# Patient Record
Sex: FEMALE | Race: WHITE | NOT HISPANIC OR LATINO | Employment: OTHER | URBAN - METROPOLITAN AREA
[De-identification: names, ages, dates, MRNs, and addresses within clinical notes are randomized per-mention and may not be internally consistent; named-entity substitution may affect disease eponyms.]

---

## 2017-05-02 ENCOUNTER — HOSPITAL ENCOUNTER (INPATIENT)
Facility: HOSPITAL | Age: 82
LOS: 3 days | Discharge: HOME | DRG: 243 | End: 2017-05-05
Attending: EMERGENCY MEDICINE | Admitting: INTERNAL MEDICINE
Payer: MEDICARE

## 2017-05-02 DIAGNOSIS — T50.905A BRADYCARDIA, DRUG INDUCED: ICD-10-CM

## 2017-05-02 DIAGNOSIS — IMO0001: Primary | ICD-10-CM

## 2017-05-02 DIAGNOSIS — R00.1 BRADYCARDIA, DRUG INDUCED: ICD-10-CM

## 2017-05-02 PROBLEM — R53.1 WEAKNESS: Status: ACTIVE | Noted: 2017-05-02

## 2017-05-02 PROBLEM — N17.9 AKI (ACUTE KIDNEY INJURY) (HCC): Status: ACTIVE | Noted: 2017-05-02

## 2017-05-02 PROBLEM — E11.9 TYPE 2 DIABETES MELLITUS (HCC): Status: ACTIVE | Noted: 2017-05-02

## 2017-05-02 PROBLEM — R77.8 ELEVATED TROPONIN: Status: ACTIVE | Noted: 2017-05-02

## 2017-05-02 PROBLEM — E87.5 HYPERKALEMIA: Status: ACTIVE | Noted: 2017-05-02

## 2017-05-02 LAB
ANION GAP BLD CALC-SCNC: 16 MMOL/L (ref 4–13)
ANION GAP SERPL CALCULATED.3IONS-SCNC: 10 MMOL/L (ref 4–13)
APTT PPP: 32 SECONDS (ref 23–35)
ATRIAL RATE: 55 BPM
BASOPHILS # BLD AUTO: 0.03 THOUSANDS/ΜL (ref 0–0.1)
BASOPHILS NFR BLD AUTO: 0 % (ref 0–1)
BUN BLD-MCNC: 82 MG/DL (ref 5–25)
BUN SERPL-MCNC: 71 MG/DL (ref 5–25)
CA-I BLD-SCNC: 1.13 MMOL/L (ref 1.12–1.32)
CALCIUM SERPL-MCNC: 9.2 MG/DL (ref 8.3–10.1)
CHLORIDE BLD-SCNC: 101 MMOL/L (ref 100–108)
CHLORIDE SERPL-SCNC: 102 MMOL/L (ref 100–108)
CO2 SERPL-SCNC: 24 MMOL/L (ref 21–32)
CREAT BLD-MCNC: 2 MG/DL (ref 0.6–1.3)
CREAT SERPL-MCNC: 2.28 MG/DL (ref 0.6–1.3)
EOSINOPHIL # BLD AUTO: 0.09 THOUSAND/ΜL (ref 0–0.61)
EOSINOPHIL NFR BLD AUTO: 1 % (ref 0–6)
ERYTHROCYTE [DISTWIDTH] IN BLOOD BY AUTOMATED COUNT: 14.7 % (ref 11.6–15.1)
GFR SERPL CREATININE-BSD FRML MDRD: 20.3 ML/MIN/1.73SQ M
GFR SERPL CREATININE-BSD FRML MDRD: 23.6 ML/MIN/1.73SQ M
GLUCOSE SERPL-MCNC: 178 MG/DL (ref 65–140)
GLUCOSE SERPL-MCNC: 180 MG/DL (ref 65–140)
GLUCOSE SERPL-MCNC: 239 MG/DL (ref 65–140)
GLUCOSE SERPL-MCNC: 69 MG/DL (ref 65–140)
GLUCOSE SERPL-MCNC: 84 MG/DL (ref 65–140)
GLUCOSE SERPL-MCNC: 99 MG/DL (ref 65–140)
HCT VFR BLD AUTO: 33.2 % (ref 34.8–46.1)
HCT VFR BLD CALC: 32 % (ref 34.8–46.1)
HGB BLD-MCNC: 10.9 G/DL (ref 11.5–15.4)
HGB BLDA-MCNC: 10.9 G/DL (ref 11.5–15.4)
HOLD SPECIMEN: NORMAL
INR PPP: 1.37 (ref 0.86–1.16)
LYMPHOCYTES # BLD AUTO: 1.87 THOUSANDS/ΜL (ref 0.6–4.47)
LYMPHOCYTES NFR BLD AUTO: 22 % (ref 14–44)
MCH RBC QN AUTO: 31.3 PG (ref 26.8–34.3)
MCHC RBC AUTO-ENTMCNC: 32.8 G/DL (ref 31.4–37.4)
MCV RBC AUTO: 95 FL (ref 82–98)
MONOCYTES # BLD AUTO: 0.73 THOUSAND/ΜL (ref 0.17–1.22)
MONOCYTES NFR BLD AUTO: 9 % (ref 4–12)
NEUTROPHILS # BLD AUTO: 5.6 THOUSANDS/ΜL (ref 1.85–7.62)
NEUTS SEG NFR BLD AUTO: 68 % (ref 43–75)
NRBC BLD AUTO-RTO: 0 /100 WBCS
PCO2 BLD: 27 MMOL/L (ref 21–32)
PLATELET # BLD AUTO: 217 THOUSANDS/UL (ref 149–390)
PMV BLD AUTO: 11.3 FL (ref 8.9–12.7)
POTASSIUM BLD-SCNC: 5.7 MMOL/L (ref 3.5–5.3)
POTASSIUM SERPL-SCNC: 6.1 MMOL/L (ref 3.5–5.3)
PROTHROMBIN TIME: 16.9 SECONDS (ref 12.1–14.4)
QRS AXIS: 35 DEGREES
QRSD INTERVAL: 78 MS
QT INTERVAL: 616 MS
QTC INTERVAL: 455 MS
RBC # BLD AUTO: 3.48 MILLION/UL (ref 3.81–5.12)
SODIUM BLD-SCNC: 138 MMOL/L (ref 136–145)
SODIUM SERPL-SCNC: 136 MMOL/L (ref 136–145)
SPECIMEN SOURCE: ABNORMAL
SPECIMEN SOURCE: ABNORMAL
T WAVE AXIS: 52 DEGREES
TROPONIN I BLD-MCNC: 0.22 NG/ML (ref 0–0.08)
VENTRICULAR RATE: 33 BPM
WBC # BLD AUTO: 8.33 THOUSAND/UL (ref 4.31–10.16)

## 2017-05-02 PROCEDURE — 93005 ELECTROCARDIOGRAM TRACING: CPT | Performed by: EMERGENCY MEDICINE

## 2017-05-02 PROCEDURE — 36415 COLL VENOUS BLD VENIPUNCTURE: CPT | Performed by: EMERGENCY MEDICINE

## 2017-05-02 PROCEDURE — 80048 BASIC METABOLIC PNL TOTAL CA: CPT | Performed by: EMERGENCY MEDICINE

## 2017-05-02 PROCEDURE — 85025 COMPLETE CBC W/AUTO DIFF WBC: CPT | Performed by: EMERGENCY MEDICINE

## 2017-05-02 PROCEDURE — 99285 EMERGENCY DEPT VISIT HI MDM: CPT

## 2017-05-02 PROCEDURE — 85730 THROMBOPLASTIN TIME PARTIAL: CPT | Performed by: EMERGENCY MEDICINE

## 2017-05-02 PROCEDURE — 80047 BASIC METABLC PNL IONIZED CA: CPT

## 2017-05-02 PROCEDURE — 85014 HEMATOCRIT: CPT

## 2017-05-02 PROCEDURE — 93005 ELECTROCARDIOGRAM TRACING: CPT

## 2017-05-02 PROCEDURE — 84484 ASSAY OF TROPONIN QUANT: CPT

## 2017-05-02 PROCEDURE — 82948 REAGENT STRIP/BLOOD GLUCOSE: CPT

## 2017-05-02 PROCEDURE — 85610 PROTHROMBIN TIME: CPT | Performed by: EMERGENCY MEDICINE

## 2017-05-02 RX ORDER — SODIUM CHLORIDE 9 MG/ML
75 INJECTION, SOLUTION INTRAVENOUS CONTINUOUS
Status: DISCONTINUED | OUTPATIENT
Start: 2017-05-02 | End: 2017-05-05

## 2017-05-02 RX ORDER — POTASSIUM CHLORIDE 750 MG/1
10 TABLET, EXTENDED RELEASE ORAL DAILY
COMMUNITY
End: 2017-05-05 | Stop reason: HOSPADM

## 2017-05-02 RX ORDER — DEXTROSE MONOHYDRATE 25 G/50ML
INJECTION, SOLUTION INTRAVENOUS
Status: COMPLETED
Start: 2017-05-02 | End: 2017-05-02

## 2017-05-02 RX ORDER — CHOLECALCIFEROL (VITAMIN D3) 50 MCG
2000 TABLET ORAL DAILY
COMMUNITY
End: 2018-07-09 | Stop reason: SDUPTHER

## 2017-05-02 RX ORDER — ASPIRIN 81 MG/1
324 TABLET, CHEWABLE ORAL ONCE
Status: COMPLETED | OUTPATIENT
Start: 2017-05-02 | End: 2017-05-02

## 2017-05-02 RX ORDER — SODIUM CHLORIDE 9 MG/ML
1000 INJECTION, SOLUTION INTRAVENOUS ONCE
Status: COMPLETED | OUTPATIENT
Start: 2017-05-02 | End: 2017-05-02

## 2017-05-02 RX ORDER — SODIUM POLYSTYRENE SULFONATE 15 G/60ML
15 SUSPENSION ORAL; RECTAL ONCE
Status: DISCONTINUED | OUTPATIENT
Start: 2017-05-02 | End: 2017-05-05 | Stop reason: HOSPADM

## 2017-05-02 RX ORDER — ATORVASTATIN CALCIUM 10 MG/1
10 TABLET, FILM COATED ORAL DAILY
Status: DISCONTINUED | OUTPATIENT
Start: 2017-05-03 | End: 2017-05-05 | Stop reason: HOSPADM

## 2017-05-02 RX ORDER — ACETAMINOPHEN 325 MG/1
650 TABLET ORAL EVERY 6 HOURS PRN
Status: DISCONTINUED | OUTPATIENT
Start: 2017-05-02 | End: 2017-05-05 | Stop reason: HOSPADM

## 2017-05-02 RX ORDER — ONDANSETRON 2 MG/ML
4 INJECTION INTRAMUSCULAR; INTRAVENOUS EVERY 6 HOURS PRN
Status: DISCONTINUED | OUTPATIENT
Start: 2017-05-02 | End: 2017-05-05 | Stop reason: HOSPADM

## 2017-05-02 RX ORDER — DOCUSATE SODIUM 100 MG/1
100 CAPSULE, LIQUID FILLED ORAL 2 TIMES DAILY PRN
Status: DISCONTINUED | OUTPATIENT
Start: 2017-05-02 | End: 2017-05-05 | Stop reason: HOSPADM

## 2017-05-02 RX ORDER — CALCIUM CARBONATE 200(500)MG
1000 TABLET,CHEWABLE ORAL DAILY PRN
Status: DISCONTINUED | OUTPATIENT
Start: 2017-05-02 | End: 2017-05-05 | Stop reason: HOSPADM

## 2017-05-02 RX ORDER — ATROPINE SULFATE 0.4 MG/ML
0.4 AMPUL (ML) INJECTION ONCE
Status: DISCONTINUED | OUTPATIENT
Start: 2017-05-02 | End: 2017-05-02 | Stop reason: SDUPTHER

## 2017-05-02 RX ORDER — DOPAMINE HYDROCHLORIDE 160 MG/100ML
1-20 INJECTION, SOLUTION INTRAVENOUS
Status: DISCONTINUED | OUTPATIENT
Start: 2017-05-02 | End: 2017-05-05 | Stop reason: HOSPADM

## 2017-05-02 RX ORDER — SODIUM POLYSTYRENE SULFONATE 15 G/60ML
15 SUSPENSION ORAL; RECTAL ONCE
Status: COMPLETED | OUTPATIENT
Start: 2017-05-02 | End: 2017-05-02

## 2017-05-02 RX ORDER — CLOTRIMAZOLE 1 %
CREAM (GRAM) TOPICAL 2 TIMES DAILY
COMMUNITY
End: 2018-08-02 | Stop reason: ALTCHOICE

## 2017-05-02 RX ORDER — DEXTROSE MONOHYDRATE 25 G/50ML
25 INJECTION, SOLUTION INTRAVENOUS ONCE
Status: COMPLETED | OUTPATIENT
Start: 2017-05-02 | End: 2017-05-02

## 2017-05-02 RX ORDER — LEVOTHYROXINE SODIUM 0.03 MG/1
25 TABLET ORAL
Status: DISCONTINUED | OUTPATIENT
Start: 2017-05-03 | End: 2017-05-05 | Stop reason: HOSPADM

## 2017-05-02 RX ORDER — TORSEMIDE 10 MG/1
10 TABLET ORAL DAILY
COMMUNITY
End: 2017-05-05 | Stop reason: HOSPADM

## 2017-05-02 RX ADMIN — SODIUM CHLORIDE 75 ML/HR: 0.9 INJECTION, SOLUTION INTRAVENOUS at 17:52

## 2017-05-02 RX ADMIN — APIXABAN 2.5 MG: 2.5 TABLET, FILM COATED ORAL at 18:04

## 2017-05-02 RX ADMIN — INSULIN LISPRO 2 UNITS: 100 INJECTION, SOLUTION INTRAVENOUS; SUBCUTANEOUS at 18:22

## 2017-05-02 RX ADMIN — ASPIRIN 324 MG: 81 TABLET, CHEWABLE ORAL at 16:23

## 2017-05-02 RX ADMIN — DEXTROSE MONOHYDRATE 25 ML: 25 INJECTION, SOLUTION INTRAVENOUS at 21:19

## 2017-05-02 RX ADMIN — SODIUM POLYSTYRENE SULFONATE 15 G: 15 SUSPENSION ORAL; RECTAL at 18:23

## 2017-05-02 RX ADMIN — SODIUM CHLORIDE 1000 ML/HR: 0.9 INJECTION, SOLUTION INTRAVENOUS at 16:10

## 2017-05-02 RX ADMIN — ACETAMINOPHEN 650 MG: 325 TABLET, FILM COATED ORAL at 22:06

## 2017-05-02 RX ADMIN — DOPAMINE HYDROCHLORIDE IN DEXTROSE 1 MCG/KG/MIN: 1.6 INJECTION, SOLUTION INTRAVENOUS at 22:27

## 2017-05-02 RX ADMIN — DEXTROSE MONOHYDRATE 25 ML: 25 INJECTION, SOLUTION INTRAVENOUS at 16:24

## 2017-05-02 RX ADMIN — INSULIN HUMAN 10 UNITS: 100 INJECTION, SOLUTION PARENTERAL at 16:26

## 2017-05-02 RX ADMIN — ATROPINE SULFATE 0.4 MG: 0.1 INJECTION, SOLUTION INTRAVENOUS at 18:15

## 2017-05-03 LAB
ANION GAP SERPL CALCULATED.3IONS-SCNC: 14 MMOL/L (ref 4–13)
ATRIAL RATE: 101 BPM
ATRIAL RATE: 288 BPM
ATRIAL RATE: 52 BPM
BUN SERPL-MCNC: 61 MG/DL (ref 5–25)
CALCIUM SERPL-MCNC: 8.8 MG/DL (ref 8.3–10.1)
CHLORIDE SERPL-SCNC: 105 MMOL/L (ref 100–108)
CO2 SERPL-SCNC: 22 MMOL/L (ref 21–32)
CREAT SERPL-MCNC: 1.75 MG/DL (ref 0.6–1.3)
ERYTHROCYTE [DISTWIDTH] IN BLOOD BY AUTOMATED COUNT: 14.7 % (ref 11.6–15.1)
GFR SERPL CREATININE-BSD FRML MDRD: 27.6 ML/MIN/1.73SQ M
GLUCOSE SERPL-MCNC: 138 MG/DL (ref 65–140)
GLUCOSE SERPL-MCNC: 176 MG/DL (ref 65–140)
GLUCOSE SERPL-MCNC: 182 MG/DL (ref 65–140)
GLUCOSE SERPL-MCNC: 209 MG/DL (ref 65–140)
GLUCOSE SERPL-MCNC: 218 MG/DL (ref 65–140)
HCT VFR BLD AUTO: 34.2 % (ref 34.8–46.1)
HGB BLD-MCNC: 11.2 G/DL (ref 11.5–15.4)
MAGNESIUM SERPL-MCNC: 2.3 MG/DL (ref 1.6–2.6)
MCH RBC QN AUTO: 31.3 PG (ref 26.8–34.3)
MCHC RBC AUTO-ENTMCNC: 32.7 G/DL (ref 31.4–37.4)
MCV RBC AUTO: 96 FL (ref 82–98)
P AXIS: 173 DEGREES
PLATELET # BLD AUTO: 220 THOUSANDS/UL (ref 149–390)
PMV BLD AUTO: 11 FL (ref 8.9–12.7)
POTASSIUM SERPL-SCNC: 4.1 MMOL/L (ref 3.5–5.3)
PR INTERVAL: 1178 MS
QRS AXIS: 14 DEGREES
QRS AXIS: 32 DEGREES
QRS AXIS: 44 DEGREES
QRSD INTERVAL: 74 MS
QRSD INTERVAL: 84 MS
QRSD INTERVAL: 92 MS
QT INTERVAL: 492 MS
QT INTERVAL: 498 MS
QT INTERVAL: 730 MS
QTC INTERVAL: 435 MS
QTC INTERVAL: 458 MS
QTC INTERVAL: 540 MS
RBC # BLD AUTO: 3.58 MILLION/UL (ref 3.81–5.12)
SODIUM SERPL-SCNC: 141 MMOL/L (ref 136–145)
T WAVE AXIS: -3 DEGREES
T WAVE AXIS: 44 DEGREES
T WAVE AXIS: 58 DEGREES
T4 FREE SERPL-MCNC: 1.71 NG/DL (ref 0.76–1.46)
TROPONIN I SERPL-MCNC: 0.32 NG/ML
VENTRICULAR RATE: 33 BPM
VENTRICULAR RATE: 46 BPM
VENTRICULAR RATE: 52 BPM
WBC # BLD AUTO: 13.24 THOUSAND/UL (ref 4.31–10.16)

## 2017-05-03 PROCEDURE — G8978 MOBILITY CURRENT STATUS: HCPCS

## 2017-05-03 PROCEDURE — 80048 BASIC METABOLIC PNL TOTAL CA: CPT | Performed by: INTERNAL MEDICINE

## 2017-05-03 PROCEDURE — 82948 REAGENT STRIP/BLOOD GLUCOSE: CPT

## 2017-05-03 PROCEDURE — 85027 COMPLETE CBC AUTOMATED: CPT | Performed by: INTERNAL MEDICINE

## 2017-05-03 PROCEDURE — 83735 ASSAY OF MAGNESIUM: CPT | Performed by: INTERNAL MEDICINE

## 2017-05-03 PROCEDURE — 84484 ASSAY OF TROPONIN QUANT: CPT | Performed by: HOSPITALIST

## 2017-05-03 PROCEDURE — 84439 ASSAY OF FREE THYROXINE: CPT | Performed by: HOSPITALIST

## 2017-05-03 PROCEDURE — G8979 MOBILITY GOAL STATUS: HCPCS

## 2017-05-03 PROCEDURE — 93005 ELECTROCARDIOGRAM TRACING: CPT

## 2017-05-03 PROCEDURE — 97163 PT EVAL HIGH COMPLEX 45 MIN: CPT

## 2017-05-03 PROCEDURE — 93005 ELECTROCARDIOGRAM TRACING: CPT | Performed by: INTERNAL MEDICINE

## 2017-05-03 RX ADMIN — ATORVASTATIN CALCIUM 10 MG: 10 TABLET, FILM COATED ORAL at 09:32

## 2017-05-03 RX ADMIN — DOPAMINE HYDROCHLORIDE IN DEXTROSE 9 MCG/KG/MIN: 1.6 INJECTION, SOLUTION INTRAVENOUS at 17:50

## 2017-05-03 RX ADMIN — SODIUM CHLORIDE 75 ML/HR: 0.9 INJECTION, SOLUTION INTRAVENOUS at 05:43

## 2017-05-03 RX ADMIN — LEVOTHYROXINE SODIUM 25 MCG: 25 TABLET ORAL at 05:45

## 2017-05-03 RX ADMIN — INSULIN LISPRO 1 UNITS: 100 INJECTION, SOLUTION INTRAVENOUS; SUBCUTANEOUS at 21:35

## 2017-05-03 RX ADMIN — INSULIN LISPRO 1 UNITS: 100 INJECTION, SOLUTION INTRAVENOUS; SUBCUTANEOUS at 12:00

## 2017-05-03 RX ADMIN — SODIUM CHLORIDE 75 ML/HR: 0.9 INJECTION, SOLUTION INTRAVENOUS at 17:50

## 2017-05-03 RX ADMIN — INSULIN LISPRO 1 UNITS: 100 INJECTION, SOLUTION INTRAVENOUS; SUBCUTANEOUS at 16:38

## 2017-05-04 ENCOUNTER — APPOINTMENT (INPATIENT)
Dept: RADIOLOGY | Facility: HOSPITAL | Age: 82
DRG: 243 | End: 2017-05-04
Payer: MEDICARE

## 2017-05-04 ENCOUNTER — APPOINTMENT (OUTPATIENT)
Dept: NON INVASIVE DIAGNOSTICS | Facility: HOSPITAL | Age: 82
DRG: 243 | End: 2017-05-04
Attending: INTERNAL MEDICINE
Payer: MEDICARE

## 2017-05-04 ENCOUNTER — ANESTHESIA EVENT (INPATIENT)
Dept: NON INVASIVE DIAGNOSTICS | Facility: HOSPITAL | Age: 82
DRG: 243 | End: 2017-05-04
Payer: MEDICARE

## 2017-05-04 LAB
ANION GAP SERPL CALCULATED.3IONS-SCNC: 11 MMOL/L (ref 4–13)
ATRIAL RATE: 49 BPM
BUN SERPL-MCNC: 34 MG/DL (ref 5–25)
CALCIUM SERPL-MCNC: 8.8 MG/DL (ref 8.3–10.1)
CHLORIDE SERPL-SCNC: 110 MMOL/L (ref 100–108)
CO2 SERPL-SCNC: 22 MMOL/L (ref 21–32)
CREAT SERPL-MCNC: 1.32 MG/DL (ref 0.6–1.3)
ERYTHROCYTE [DISTWIDTH] IN BLOOD BY AUTOMATED COUNT: 14.3 % (ref 11.6–15.1)
GFR SERPL CREATININE-BSD FRML MDRD: 38.2 ML/MIN/1.73SQ M
GLUCOSE SERPL-MCNC: 126 MG/DL (ref 65–140)
GLUCOSE SERPL-MCNC: 130 MG/DL (ref 65–140)
GLUCOSE SERPL-MCNC: 176 MG/DL (ref 65–140)
GLUCOSE SERPL-MCNC: 188 MG/DL (ref 65–140)
GLUCOSE SERPL-MCNC: 217 MG/DL (ref 65–140)
HCT VFR BLD AUTO: 35.1 % (ref 34.8–46.1)
HGB BLD-MCNC: 11.7 G/DL (ref 11.5–15.4)
INR PPP: 1.32 (ref 0.86–1.16)
MCH RBC QN AUTO: 31.7 PG (ref 26.8–34.3)
MCHC RBC AUTO-ENTMCNC: 33.3 G/DL (ref 31.4–37.4)
MCV RBC AUTO: 95 FL (ref 82–98)
PLATELET # BLD AUTO: 254 THOUSANDS/UL (ref 149–390)
PMV BLD AUTO: 11.1 FL (ref 8.9–12.7)
POTASSIUM SERPL-SCNC: 4.1 MMOL/L (ref 3.5–5.3)
PROTHROMBIN TIME: 16.5 SECONDS (ref 12.1–14.4)
QRS AXIS: -5 DEGREES
QRSD INTERVAL: 84 MS
QT INTERVAL: 408 MS
QTC INTERVAL: 364 MS
RBC # BLD AUTO: 3.69 MILLION/UL (ref 3.81–5.12)
SODIUM SERPL-SCNC: 143 MMOL/L (ref 136–145)
T WAVE AXIS: -14 DEGREES
TSH SERPL DL<=0.05 MIU/L-ACNC: 0.83 UIU/ML (ref 0.36–3.74)
VENTRICULAR RATE: 48 BPM
WBC # BLD AUTO: 11.54 THOUSAND/UL (ref 4.31–10.16)

## 2017-05-04 PROCEDURE — 02HK3JZ INSERTION OF PACEMAKER LEAD INTO RIGHT VENTRICLE, PERCUTANEOUS APPROACH: ICD-10-PCS | Performed by: INTERNAL MEDICINE

## 2017-05-04 PROCEDURE — 84443 ASSAY THYROID STIM HORMONE: CPT | Performed by: HOSPITALIST

## 2017-05-04 PROCEDURE — 93005 ELECTROCARDIOGRAM TRACING: CPT | Performed by: INTERNAL MEDICINE

## 2017-05-04 PROCEDURE — 93005 ELECTROCARDIOGRAM TRACING: CPT | Performed by: PHYSICIAN ASSISTANT

## 2017-05-04 PROCEDURE — 71010 HB CHEST X-RAY 1 VIEW FRONTAL (PORTABLE): CPT

## 2017-05-04 PROCEDURE — 82948 REAGENT STRIP/BLOOD GLUCOSE: CPT

## 2017-05-04 PROCEDURE — 0JH604Z INSERTION OF PACEMAKER, SINGLE CHAMBER INTO CHEST SUBCUTANEOUS TISSUE AND FASCIA, OPEN APPROACH: ICD-10-PCS | Performed by: INTERNAL MEDICINE

## 2017-05-04 PROCEDURE — 99152 MOD SED SAME PHYS/QHP 5/>YRS: CPT | Performed by: PHYSICIAN ASSISTANT

## 2017-05-04 PROCEDURE — C1892 INTRO/SHEATH,FIXED,PEEL-AWAY: HCPCS | Performed by: PHYSICIAN ASSISTANT

## 2017-05-04 PROCEDURE — C1786 PMKR, SINGLE, RATE-RESP: HCPCS

## 2017-05-04 PROCEDURE — 80048 BASIC METABOLIC PNL TOTAL CA: CPT | Performed by: PHYSICIAN ASSISTANT

## 2017-05-04 PROCEDURE — 33207 INSERT HEART PM VENTRICULAR: CPT | Performed by: PHYSICIAN ASSISTANT

## 2017-05-04 PROCEDURE — 99153 MOD SED SAME PHYS/QHP EA: CPT | Performed by: PHYSICIAN ASSISTANT

## 2017-05-04 PROCEDURE — C1898 LEAD, PMKR, OTHER THAN TRANS: HCPCS

## 2017-05-04 PROCEDURE — 85610 PROTHROMBIN TIME: CPT | Performed by: PHYSICIAN ASSISTANT

## 2017-05-04 PROCEDURE — 85027 COMPLETE CBC AUTOMATED: CPT | Performed by: PHYSICIAN ASSISTANT

## 2017-05-04 RX ORDER — MIDAZOLAM HYDROCHLORIDE 1 MG/ML
INJECTION INTRAMUSCULAR; INTRAVENOUS CODE/TRAUMA/SEDATION MEDICATION
Status: COMPLETED | OUTPATIENT
Start: 2017-05-04 | End: 2017-05-04

## 2017-05-04 RX ORDER — LIDOCAINE HYDROCHLORIDE 10 MG/ML
INJECTION, SOLUTION INFILTRATION; PERINEURAL CODE/TRAUMA/SEDATION MEDICATION
Status: COMPLETED | OUTPATIENT
Start: 2017-05-04 | End: 2017-05-04

## 2017-05-04 RX ORDER — INSULIN GLARGINE 100 [IU]/ML
5 INJECTION, SOLUTION SUBCUTANEOUS
Status: DISCONTINUED | OUTPATIENT
Start: 2017-05-04 | End: 2017-05-05 | Stop reason: HOSPADM

## 2017-05-04 RX ORDER — FENTANYL CITRATE 50 UG/ML
INJECTION, SOLUTION INTRAMUSCULAR; INTRAVENOUS CODE/TRAUMA/SEDATION MEDICATION
Status: COMPLETED | OUTPATIENT
Start: 2017-05-04 | End: 2017-05-04

## 2017-05-04 RX ORDER — GENTAMICIN SULFATE 40 MG/ML
INJECTION, SOLUTION INTRAMUSCULAR; INTRAVENOUS CODE/TRAUMA/SEDATION MEDICATION
Status: COMPLETED | OUTPATIENT
Start: 2017-05-04 | End: 2017-05-04

## 2017-05-04 RX ADMIN — FENTANYL CITRATE 100 MCG: 50 INJECTION, SOLUTION INTRAMUSCULAR; INTRAVENOUS at 17:38

## 2017-05-04 RX ADMIN — INSULIN LISPRO 1 UNITS: 100 INJECTION, SOLUTION INTRAVENOUS; SUBCUTANEOUS at 12:12

## 2017-05-04 RX ADMIN — CEFAZOLIN SODIUM 1000 MG: 1 SOLUTION INTRAVENOUS at 17:40

## 2017-05-04 RX ADMIN — GENTAMICIN SULFATE 80 MG: 40 INJECTION, SOLUTION INTRAMUSCULAR; INTRAVENOUS at 17:59

## 2017-05-04 RX ADMIN — LIDOCAINE HYDROCHLORIDE 30 ML: 10 INJECTION, SOLUTION INFILTRATION; PERINEURAL at 17:45

## 2017-05-04 RX ADMIN — IOHEXOL 10 ML: 350 INJECTION, SOLUTION INTRAVENOUS at 17:49

## 2017-05-04 RX ADMIN — MIDAZOLAM HYDROCHLORIDE 2 MG: 1 INJECTION, SOLUTION INTRAMUSCULAR; INTRAVENOUS at 17:37

## 2017-05-04 RX ADMIN — ATORVASTATIN CALCIUM 10 MG: 10 TABLET, FILM COATED ORAL at 18:51

## 2017-05-04 RX ADMIN — LEVOTHYROXINE SODIUM 25 MCG: 25 TABLET ORAL at 06:03

## 2017-05-04 RX ADMIN — INSULIN GLARGINE 5 UNITS: 100 INJECTION, SOLUTION SUBCUTANEOUS at 21:28

## 2017-05-04 RX ADMIN — SODIUM CHLORIDE 75 ML/HR: 0.9 INJECTION, SOLUTION INTRAVENOUS at 06:00

## 2017-05-04 RX ADMIN — FENTANYL CITRATE 25 MCG: 50 INJECTION, SOLUTION INTRAMUSCULAR; INTRAVENOUS at 18:02

## 2017-05-04 RX ADMIN — MIDAZOLAM HYDROCHLORIDE 1 MG: 1 INJECTION, SOLUTION INTRAMUSCULAR; INTRAVENOUS at 18:02

## 2017-05-04 RX ADMIN — INSULIN LISPRO 1 UNITS: 100 INJECTION, SOLUTION INTRAVENOUS; SUBCUTANEOUS at 06:44

## 2017-05-04 RX ADMIN — DOPAMINE HYDROCHLORIDE IN DEXTROSE 14.8 MCG/KG/MIN: 1.6 INJECTION, SOLUTION INTRAVENOUS at 06:00

## 2017-05-05 ENCOUNTER — APPOINTMENT (INPATIENT)
Dept: RADIOLOGY | Facility: HOSPITAL | Age: 82
DRG: 243 | End: 2017-05-05
Attending: INTERNAL MEDICINE
Payer: MEDICARE

## 2017-05-05 VITALS
RESPIRATION RATE: 18 BRPM | HEART RATE: 60 BPM | HEIGHT: 65 IN | DIASTOLIC BLOOD PRESSURE: 76 MMHG | BODY MASS INDEX: 24.61 KG/M2 | TEMPERATURE: 97.9 F | SYSTOLIC BLOOD PRESSURE: 124 MMHG | OXYGEN SATURATION: 95 % | WEIGHT: 147.71 LBS

## 2017-05-05 PROBLEM — R77.8 ELEVATED TROPONIN: Status: RESOLVED | Noted: 2017-05-02 | Resolved: 2017-05-05

## 2017-05-05 PROBLEM — N17.9 AKI (ACUTE KIDNEY INJURY) (HCC): Status: RESOLVED | Noted: 2017-05-02 | Resolved: 2017-05-05

## 2017-05-05 PROBLEM — E87.5 HYPERKALEMIA: Status: RESOLVED | Noted: 2017-05-02 | Resolved: 2017-05-05

## 2017-05-05 PROBLEM — Z95.0 STATUS POST PLACEMENT OF CARDIAC PACEMAKER: Chronic | Status: ACTIVE | Noted: 2017-05-05

## 2017-05-05 PROBLEM — R00.1 BRADYCARDIA, DRUG INDUCED: Status: RESOLVED | Noted: 2017-05-02 | Resolved: 2017-05-05

## 2017-05-05 PROBLEM — R53.1 WEAKNESS: Status: RESOLVED | Noted: 2017-05-02 | Resolved: 2017-05-05

## 2017-05-05 PROBLEM — T50.905A BRADYCARDIA, DRUG INDUCED: Status: RESOLVED | Noted: 2017-05-02 | Resolved: 2017-05-05

## 2017-05-05 LAB
ANION GAP SERPL CALCULATED.3IONS-SCNC: 8 MMOL/L (ref 4–13)
ATRIAL RATE: 227 BPM
ATRIAL RATE: 54 BPM
BUN SERPL-MCNC: 23 MG/DL (ref 5–25)
CALCIUM SERPL-MCNC: 8.2 MG/DL (ref 8.3–10.1)
CHLORIDE SERPL-SCNC: 115 MMOL/L (ref 100–108)
CO2 SERPL-SCNC: 23 MMOL/L (ref 21–32)
CREAT SERPL-MCNC: 0.99 MG/DL (ref 0.6–1.3)
ERYTHROCYTE [DISTWIDTH] IN BLOOD BY AUTOMATED COUNT: 14.4 % (ref 11.6–15.1)
FERRITIN SERPL-MCNC: 94 NG/ML (ref 8–388)
FOLATE SERPL-MCNC: >20 NG/ML (ref 3.1–17.5)
GFR SERPL CREATININE-BSD FRML MDRD: 53.2 ML/MIN/1.73SQ M
GLUCOSE SERPL-MCNC: 135 MG/DL (ref 65–140)
GLUCOSE SERPL-MCNC: 75 MG/DL (ref 65–140)
GLUCOSE SERPL-MCNC: 91 MG/DL (ref 65–140)
HCT VFR BLD AUTO: 28.8 % (ref 34.8–46.1)
HGB BLD-MCNC: 9.5 G/DL (ref 11.5–15.4)
IRON SATN MFR SERPL: 16 %
IRON SERPL-MCNC: 32 UG/DL (ref 50–170)
MCH RBC QN AUTO: 31.6 PG (ref 26.8–34.3)
MCHC RBC AUTO-ENTMCNC: 33 G/DL (ref 31.4–37.4)
MCV RBC AUTO: 96 FL (ref 82–98)
PLATELET # BLD AUTO: 179 THOUSANDS/UL (ref 149–390)
PMV BLD AUTO: 10.5 FL (ref 8.9–12.7)
POTASSIUM SERPL-SCNC: 3.6 MMOL/L (ref 3.5–5.3)
QRS AXIS: -67 DEGREES
QRS AXIS: -75 DEGREES
QRSD INTERVAL: 122 MS
QRSD INTERVAL: 124 MS
QT INTERVAL: 482 MS
QT INTERVAL: 486 MS
QTC INTERVAL: 482 MS
QTC INTERVAL: 486 MS
RBC # BLD AUTO: 3.01 MILLION/UL (ref 3.81–5.12)
RETICS # AUTO: ABNORMAL 10*3/UL (ref 14097–95744)
RETICS # CALC: 2.22 % (ref 0.37–1.87)
SODIUM SERPL-SCNC: 146 MMOL/L (ref 136–145)
T WAVE AXIS: 78 DEGREES
T WAVE AXIS: 82 DEGREES
TIBC SERPL-MCNC: 205 UG/DL (ref 250–450)
VENTRICULAR RATE: 60 BPM
VENTRICULAR RATE: 60 BPM
VIT B12 SERPL-MCNC: 273 PG/ML (ref 100–900)
WBC # BLD AUTO: 6.54 THOUSAND/UL (ref 4.31–10.16)

## 2017-05-05 PROCEDURE — 80048 BASIC METABOLIC PNL TOTAL CA: CPT | Performed by: PHYSICIAN ASSISTANT

## 2017-05-05 PROCEDURE — 82948 REAGENT STRIP/BLOOD GLUCOSE: CPT

## 2017-05-05 PROCEDURE — 82607 VITAMIN B-12: CPT | Performed by: HOSPITALIST

## 2017-05-05 PROCEDURE — 93005 ELECTROCARDIOGRAM TRACING: CPT | Performed by: INTERNAL MEDICINE

## 2017-05-05 PROCEDURE — 83540 ASSAY OF IRON: CPT | Performed by: HOSPITALIST

## 2017-05-05 PROCEDURE — 82746 ASSAY OF FOLIC ACID SERUM: CPT | Performed by: HOSPITALIST

## 2017-05-05 PROCEDURE — 85045 AUTOMATED RETICULOCYTE COUNT: CPT | Performed by: HOSPITALIST

## 2017-05-05 PROCEDURE — 82728 ASSAY OF FERRITIN: CPT | Performed by: HOSPITALIST

## 2017-05-05 PROCEDURE — 85027 COMPLETE CBC AUTOMATED: CPT | Performed by: PHYSICIAN ASSISTANT

## 2017-05-05 PROCEDURE — 4B02XTZ MEASUREMENT OF CARDIAC DEFIBRILLATOR, EXTERNAL APPROACH: ICD-10-PCS | Performed by: HOSPITALIST

## 2017-05-05 PROCEDURE — 83550 IRON BINDING TEST: CPT | Performed by: HOSPITALIST

## 2017-05-05 PROCEDURE — 71020 HB CHEST X-RAY 2VW FRONTAL&LATL: CPT

## 2017-05-05 RX ORDER — INSULIN GLARGINE 100 [IU]/ML
5 INJECTION, SOLUTION SUBCUTANEOUS
Qty: 150 UNITS | Refills: 0 | Status: SHIPPED | OUTPATIENT
Start: 2017-05-05 | End: 2017-05-05 | Stop reason: HOSPADM

## 2017-05-05 RX ADMIN — ATORVASTATIN CALCIUM 10 MG: 10 TABLET, FILM COATED ORAL at 09:21

## 2017-05-05 RX ADMIN — LEVOTHYROXINE SODIUM 25 MCG: 25 TABLET ORAL at 06:19

## 2017-05-05 RX ADMIN — SODIUM CHLORIDE 75 ML/HR: 0.9 INJECTION, SOLUTION INTRAVENOUS at 06:11

## 2017-05-10 ENCOUNTER — APPOINTMENT (EMERGENCY)
Dept: RADIOLOGY | Facility: HOSPITAL | Age: 82
End: 2017-05-10
Payer: MEDICARE

## 2017-05-10 ENCOUNTER — HOSPITAL ENCOUNTER (EMERGENCY)
Facility: HOSPITAL | Age: 82
Discharge: HOME | End: 2017-05-10
Attending: EMERGENCY MEDICINE | Admitting: EMERGENCY MEDICINE
Payer: MEDICARE

## 2017-05-10 VITALS
DIASTOLIC BLOOD PRESSURE: 79 MMHG | SYSTOLIC BLOOD PRESSURE: 169 MMHG | OXYGEN SATURATION: 98 % | HEIGHT: 65 IN | HEART RATE: 60 BPM | TEMPERATURE: 98.5 F | RESPIRATION RATE: 14 BRPM | WEIGHT: 147 LBS | BODY MASS INDEX: 24.49 KG/M2

## 2017-05-10 DIAGNOSIS — Z95.0 HISTORY OF PERMANENT CARDIAC PACEMAKER PLACEMENT: ICD-10-CM

## 2017-05-10 DIAGNOSIS — M79.89 LEFT ARM SWELLING: Primary | ICD-10-CM

## 2017-05-10 DIAGNOSIS — I82.612 CEPHALIC VEIN THROMBOSIS, LEFT: ICD-10-CM

## 2017-05-10 LAB
ATRIAL RATE: 122 BPM
P AXIS: 0 DEGREES
QRS AXIS: -78 DEGREES
QRSD INTERVAL: 152 MS
QT INTERVAL: 462 MS
QTC INTERVAL: 457 MS
T WAVE AXIS: 101 DEGREES
VENTRICULAR RATE: 59 BPM

## 2017-05-10 PROCEDURE — 93005 ELECTROCARDIOGRAM TRACING: CPT | Performed by: EMERGENCY MEDICINE

## 2017-05-10 PROCEDURE — 93971 EXTREMITY STUDY: CPT

## 2017-05-10 PROCEDURE — 99284 EMERGENCY DEPT VISIT MOD MDM: CPT

## 2017-05-10 PROCEDURE — 71020 HB CHEST X-RAY 2VW FRONTAL&LATL: CPT

## 2017-05-10 RX ORDER — TORSEMIDE 10 MG/1
10 TABLET ORAL AS NEEDED
COMMUNITY
End: 2018-01-10 | Stop reason: HOSPADM

## 2017-05-22 ENCOUNTER — ALLSCRIPTS OFFICE VISIT (OUTPATIENT)
Dept: OTHER | Facility: OTHER | Age: 82
End: 2017-05-22

## 2017-12-06 ENCOUNTER — ALLSCRIPTS OFFICE VISIT (OUTPATIENT)
Dept: OTHER | Facility: OTHER | Age: 82
End: 2017-12-06

## 2017-12-07 NOTE — CONSULTS
Assessment    1  Incontinence (178 91) (R32)    Discussion/Summary  Discussion Summary:   49-year-old female with urinary urgency and urge incontinence  Had a lengthy discussion with the patient and her daughter  I would like to try her on Myrbetriq  Side effects of the medication were discussed  We discussed we will try at least 2 medications prior to discussing advanced treatment options  She will follow up in 6 weeks to re-evaluate her symptoms on the new medicine  Chief Complaint  Chief Complaint Free Text Note Form: Patient presents for worsening incontinence      History of Present Illness  HPI: 49-year-old female presents for evaluation of urinary incontinence  The patient has had this for numerous years but states that is getting worse  She currently has no control of urination  She states she wears 6 pads during the day and 4 pads at night  She is in a nursing home with limited mobility  She denies any recent UTIs or gross hematuria  She has no other complaints  Review of Systems  Complete-Female Urology:  Constitutional: No fever, no chills, feels well, no tiredness, no recent weight gain or weight loss  Respiratory: No complaints of shortness of breath, no wheezing, no cough, no SOB on exertion, no orthopnea, no PND  Cardiovascular: No complaints of slow heart rate, no fast heart rate, no chest pain, no palpitations, no leg claudication, no lower extremity edema  Gastrointestinal: No complaints of abdominal pain, no constipation, no nausea or vomiting, no diarrhea, no bloody stools  Genitourinary: 6 pad durin the day and 4 pads at night; has constant leak,-- incontinence-- and-- stream quality good, but-- no dysuria,-- no urinary hesitancy,-- no empty sensation-- and-- no hematuria--   The patient presents with complaints of nocturia (1-2x)  Musculoskeletal: No complaints of arthralgias, no myalgias, no joint swelling or stiffness, no limb pain or swelling    Integumentary: No complaints of skin rash or lesions, no itching, no skin wounds, no breast pain or lump  Hematologic/Lymphatic: No complaints of swollen glands, no swollen glands in the neck, does not bleed easily, does not bruise easily  Neurological: No complaints of headache, no confusion, no convulsions, no numbness, no dizziness or fainting, no tingling, no limb weakness, no difficulty walking  ROS Reviewed:   ROS reviewed  Active Problems  1  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)   2  Atherosclerotic peripheral vascular disease (440 20) (I70 209)   3  Callus (700) (L84)   4  Diabetes mellitus with polyneuropathy (250 60,357 2) (E11 42)   5  Incontinence (788 30) (R32)   6  Onychomycosis (110 1) (B35 1)   7  Pain in both feet (729 5) (M79 671,M79 672)    Past Medical History  Active Problems And Past Medical History Reviewed: The active problems and past medical history were reviewed and updated today  Surgical History  Surgical History Reviewed: The surgical history was reviewed and updated today  Family History  Family History Reviewed: The family history was reviewed and updated today  Social History     · Never a smoker  Social History Reviewed: The social history was reviewed and updated today  Current Meds   1  Acetaminophen 325 MG Oral Tablet; Therapy: (Recorded:29Jun2016) to Recorded   2  Atorvastatin Calcium 10 MG Oral Tablet; Therapy: (Recorded:29Jun2016) to Recorded   3  Clotrimazole-Betamethasone 1-0 05 % External Cream; Therapy: (Recorded:29Jun2016) to Recorded   4  Eliquis TABS; Therapy: (Recorded:29Jun2016) to Recorded   5  Ferrex 150 Plus 150-50-50 MG Oral Capsule; Therapy: (21 ) to Recorded   6  Fluticasone Propionate 50 MCG/ACT Nasal Suspension; Therapy: (Recorded:[id number]) to Recorded   7  Glucagon Emergency 1 MG Injection Kit; Therapy: (21 ) to Recorded   8  HumaLOG SOLN; Therapy: (Recorded:29Jun2016) to Recorded   9   Irbesartan 150 MG Oral Tablet; Therapy: (Recorded:29Jun2016) to Recorded   10  Levothyroxine Sodium 25 MCG CAPS; Therapy: (Recorded:29Jun2016) to Recorded   11  Lisinopril 10 MG Oral Tablet; Therapy: (Recorded:06Dec2017) to Recorded   12  Mapap 500 MG Oral Capsule; Therapy: (Recorded:06Dec2017) to Recorded   13  Metoprolol Tartrate TABS; Therapy: (Recorded:29Jun2016) to Recorded   14  Milk of Magnesia SUSP; Therapy: (Recorded:29Jun2016) to Recorded   15  Potassium Chloride 10 MEQ TBCR; Therapy: (Recorded:29Jun2016) to Recorded   16  Torsemide 10 MG Oral Tablet; Therapy: (Recorded:06Dec2017) to Recorded   17  Vitamin D3 2000 UNIT Oral Tablet; Therapy: (Recorded:06Dec2017) to Recorded  Medication List Reviewed: The medication list was reviewed and updated today  Allergies  1  No Known Drug Allergies    Vitals  Vital Signs    Recorded: [id number] 02:05PM   Heart Rate 60   Systolic 097   Diastolic 90   Height Unobtainable Yes   Weight Unobtainable Yes       Physical Exam   Constitutional  General appearance: Abnormal  -- Elderly female in wheelchair  Pulmonary  Respiratory effort: No increased work of breathing or signs of respiratory distress  Cardiovascular  Examination of extremities for edema and/or varicosities: Normal    Abdomen  Abdomen: Non-tender, no masses  Liver and spleen: No hepatomegaly or splenomegaly  Musculoskeletal  Gait and station: Abnormal  -- In wheelchair  Skin  Skin and subcutaneous tissue: Normal without rashes or lesions     Additional Exam:  Neuro exam nonfocal       Signatures   Electronically signed by : FREDERIC Hickman ; Dec  6 2017  2:36PM EST                       (Author)

## 2018-01-05 ENCOUNTER — APPOINTMENT (EMERGENCY)
Dept: RADIOLOGY | Facility: HOSPITAL | Age: 83
DRG: 866 | End: 2018-01-05
Payer: MEDICARE

## 2018-01-05 ENCOUNTER — HOSPITAL ENCOUNTER (INPATIENT)
Facility: HOSPITAL | Age: 83
LOS: 4 days | Discharge: NURSING, REHAB OR LONG-TERM CARE | DRG: 866 | End: 2018-01-10
Attending: EMERGENCY MEDICINE | Admitting: INTERNAL MEDICINE
Payer: MEDICARE

## 2018-01-05 DIAGNOSIS — R42 DIZZINESS: ICD-10-CM

## 2018-01-05 DIAGNOSIS — R50.9 FEVER: ICD-10-CM

## 2018-01-05 DIAGNOSIS — R26.2 AMBULATORY DYSFUNCTION: Primary | ICD-10-CM

## 2018-01-05 PROBLEM — E78.5 HYPERLIPIDEMIA: Status: ACTIVE | Noted: 2018-01-05

## 2018-01-05 PROBLEM — L53.9 ERYTHEMA OF LOWER EXTREMITY: Status: ACTIVE | Noted: 2018-01-05

## 2018-01-05 LAB
ALBUMIN SERPL BCP-MCNC: 3.4 G/DL (ref 3.5–5)
ALP SERPL-CCNC: 88 U/L (ref 46–116)
ALT SERPL W P-5'-P-CCNC: 25 U/L (ref 12–78)
ANION GAP SERPL CALCULATED.3IONS-SCNC: 8 MMOL/L (ref 4–13)
AST SERPL W P-5'-P-CCNC: 21 U/L (ref 5–45)
ATRIAL RATE: 267 BPM
BASOPHILS # BLD AUTO: 0.01 THOUSANDS/ΜL (ref 0–0.1)
BASOPHILS NFR BLD AUTO: 0 % (ref 0–1)
BILIRUB SERPL-MCNC: 0.88 MG/DL (ref 0.2–1)
BILIRUB UR QL STRIP: NEGATIVE
BUN SERPL-MCNC: 20 MG/DL (ref 5–25)
CALCIUM SERPL-MCNC: 9 MG/DL (ref 8.3–10.1)
CHLORIDE SERPL-SCNC: 106 MMOL/L (ref 100–108)
CLARITY UR: CLEAR
CO2 SERPL-SCNC: 27 MMOL/L (ref 21–32)
COLOR UR: YELLOW
COLOR, POC: NORMAL
CREAT SERPL-MCNC: 1.1 MG/DL (ref 0.6–1.3)
EOSINOPHIL # BLD AUTO: 0.32 THOUSAND/ΜL (ref 0–0.61)
EOSINOPHIL NFR BLD AUTO: 4 % (ref 0–6)
ERYTHROCYTE [DISTWIDTH] IN BLOOD BY AUTOMATED COUNT: 13.7 % (ref 11.6–15.1)
FLUAV AG SPEC QL: NORMAL
FLUBV AG SPEC QL: NORMAL
GFR SERPL CREATININE-BSD FRML MDRD: 46 ML/MIN/1.73SQ M
GLUCOSE SERPL-MCNC: 121 MG/DL (ref 65–140)
GLUCOSE SERPL-MCNC: 151 MG/DL (ref 65–140)
GLUCOSE SERPL-MCNC: 160 MG/DL (ref 65–140)
GLUCOSE UR STRIP-MCNC: NEGATIVE MG/DL
HCT VFR BLD AUTO: 33.8 % (ref 34.8–46.1)
HGB BLD-MCNC: 11.3 G/DL (ref 11.5–15.4)
HGB UR QL STRIP.AUTO: NEGATIVE
KETONES UR STRIP-MCNC: NEGATIVE MG/DL
LACTATE SERPL-SCNC: 1.1 MMOL/L (ref 0.5–2)
LEUKOCYTE ESTERASE UR QL STRIP: NEGATIVE
LYMPHOCYTES # BLD AUTO: 1.02 THOUSANDS/ΜL (ref 0.6–4.47)
LYMPHOCYTES NFR BLD AUTO: 13 % (ref 14–44)
MCH RBC QN AUTO: 31.6 PG (ref 26.8–34.3)
MCHC RBC AUTO-ENTMCNC: 33.4 G/DL (ref 31.4–37.4)
MCV RBC AUTO: 94 FL (ref 82–98)
MONOCYTES # BLD AUTO: 0.89 THOUSAND/ΜL (ref 0.17–1.22)
MONOCYTES NFR BLD AUTO: 11 % (ref 4–12)
NEUTROPHILS # BLD AUTO: 5.54 THOUSANDS/ΜL (ref 1.85–7.62)
NEUTS SEG NFR BLD AUTO: 72 % (ref 43–75)
NITRITE UR QL STRIP: NEGATIVE
NRBC BLD AUTO-RTO: 0 /100 WBCS
PH UR STRIP.AUTO: 5.5 [PH] (ref 4.5–8)
PLATELET # BLD AUTO: 187 THOUSANDS/UL (ref 149–390)
PMV BLD AUTO: 10.4 FL (ref 8.9–12.7)
POTASSIUM SERPL-SCNC: 3.7 MMOL/L (ref 3.5–5.3)
PROT SERPL-MCNC: 7.3 G/DL (ref 6.4–8.2)
PROT UR STRIP-MCNC: NEGATIVE MG/DL
QRS AXIS: -70 DEGREES
QRSD INTERVAL: 128 MS
QT INTERVAL: 410 MS
QTC INTERVAL: 433 MS
RBC # BLD AUTO: 3.58 MILLION/UL (ref 3.81–5.12)
RSV B RNA SPEC QL NAA+PROBE: NORMAL
SODIUM SERPL-SCNC: 141 MMOL/L (ref 136–145)
SP GR UR STRIP.AUTO: 1.01 (ref 1–1.03)
T WAVE AXIS: 105 DEGREES
TROPONIN I SERPL-MCNC: 0.02 NG/ML
UROBILINOGEN UR QL STRIP.AUTO: 0.2 E.U./DL
VENTRICULAR RATE: 67 BPM
WBC # BLD AUTO: 7.8 THOUSAND/UL (ref 4.31–10.16)

## 2018-01-05 PROCEDURE — 87040 BLOOD CULTURE FOR BACTERIA: CPT | Performed by: EMERGENCY MEDICINE

## 2018-01-05 PROCEDURE — 99285 EMERGENCY DEPT VISIT HI MDM: CPT

## 2018-01-05 PROCEDURE — 82948 REAGENT STRIP/BLOOD GLUCOSE: CPT

## 2018-01-05 PROCEDURE — 93005 ELECTROCARDIOGRAM TRACING: CPT | Performed by: EMERGENCY MEDICINE

## 2018-01-05 PROCEDURE — 36415 COLL VENOUS BLD VENIPUNCTURE: CPT

## 2018-01-05 PROCEDURE — 71046 X-RAY EXAM CHEST 2 VIEWS: CPT

## 2018-01-05 PROCEDURE — 96361 HYDRATE IV INFUSION ADD-ON: CPT

## 2018-01-05 PROCEDURE — 81003 URINALYSIS AUTO W/O SCOPE: CPT

## 2018-01-05 PROCEDURE — 87081 CULTURE SCREEN ONLY: CPT | Performed by: INTERNAL MEDICINE

## 2018-01-05 PROCEDURE — 87798 DETECT AGENT NOS DNA AMP: CPT | Performed by: EMERGENCY MEDICINE

## 2018-01-05 PROCEDURE — 96360 HYDRATION IV INFUSION INIT: CPT

## 2018-01-05 PROCEDURE — 84484 ASSAY OF TROPONIN QUANT: CPT | Performed by: EMERGENCY MEDICINE

## 2018-01-05 PROCEDURE — 81002 URINALYSIS NONAUTO W/O SCOPE: CPT | Performed by: EMERGENCY MEDICINE

## 2018-01-05 PROCEDURE — 93005 ELECTROCARDIOGRAM TRACING: CPT

## 2018-01-05 PROCEDURE — 70450 CT HEAD/BRAIN W/O DYE: CPT

## 2018-01-05 PROCEDURE — 80053 COMPREHEN METABOLIC PANEL: CPT | Performed by: EMERGENCY MEDICINE

## 2018-01-05 PROCEDURE — 85025 COMPLETE CBC W/AUTO DIFF WBC: CPT | Performed by: EMERGENCY MEDICINE

## 2018-01-05 PROCEDURE — 83605 ASSAY OF LACTIC ACID: CPT | Performed by: EMERGENCY MEDICINE

## 2018-01-05 RX ORDER — ACETAMINOPHEN 500 MG
500 TABLET ORAL EVERY 6 HOURS PRN
COMMUNITY

## 2018-01-05 RX ORDER — ACETAMINOPHEN 325 MG/1
500 TABLET ORAL EVERY 6 HOURS PRN
Status: DISCONTINUED | OUTPATIENT
Start: 2018-01-05 | End: 2018-01-10 | Stop reason: HOSPADM

## 2018-01-05 RX ORDER — LISINOPRIL 10 MG/1
10 TABLET ORAL DAILY
COMMUNITY
End: 2018-07-11

## 2018-01-05 RX ORDER — ATORVASTATIN CALCIUM 10 MG/1
10 TABLET, FILM COATED ORAL DAILY
Status: DISCONTINUED | OUTPATIENT
Start: 2018-01-05 | End: 2018-01-10 | Stop reason: HOSPADM

## 2018-01-05 RX ORDER — ONDANSETRON 2 MG/ML
4 INJECTION INTRAMUSCULAR; INTRAVENOUS EVERY 6 HOURS PRN
Status: DISCONTINUED | OUTPATIENT
Start: 2018-01-05 | End: 2018-01-10 | Stop reason: HOSPADM

## 2018-01-05 RX ORDER — MAGNESIUM HYDROXIDE/ALUMINUM HYDROXICE/SIMETHICONE 120; 1200; 1200 MG/30ML; MG/30ML; MG/30ML
30 SUSPENSION ORAL EVERY 6 HOURS PRN
Status: DISCONTINUED | OUTPATIENT
Start: 2018-01-05 | End: 2018-01-10 | Stop reason: HOSPADM

## 2018-01-05 RX ORDER — SODIUM CHLORIDE 9 MG/ML
75 INJECTION, SOLUTION INTRAVENOUS CONTINUOUS
Status: DISCONTINUED | OUTPATIENT
Start: 2018-01-05 | End: 2018-01-09

## 2018-01-05 RX ORDER — LEVOTHYROXINE SODIUM 0.03 MG/1
25 TABLET ORAL
Status: DISCONTINUED | OUTPATIENT
Start: 2018-01-06 | End: 2018-01-10 | Stop reason: HOSPADM

## 2018-01-05 RX ORDER — MECLIZINE HYDROCHLORIDE 25 MG/1
25 TABLET ORAL EVERY 8 HOURS PRN
Status: DISCONTINUED | OUTPATIENT
Start: 2018-01-05 | End: 2018-01-10 | Stop reason: HOSPADM

## 2018-01-05 RX ORDER — CLOTRIMAZOLE 1 %
CREAM (GRAM) TOPICAL 2 TIMES DAILY
Status: DISCONTINUED | OUTPATIENT
Start: 2018-01-05 | End: 2018-01-10 | Stop reason: HOSPADM

## 2018-01-05 RX ORDER — LISINOPRIL 10 MG/1
10 TABLET ORAL DAILY
Status: DISCONTINUED | OUTPATIENT
Start: 2018-01-05 | End: 2018-01-10 | Stop reason: HOSPADM

## 2018-01-05 RX ORDER — DOCUSATE SODIUM 100 MG/1
100 CAPSULE, LIQUID FILLED ORAL 2 TIMES DAILY
Status: DISCONTINUED | OUTPATIENT
Start: 2018-01-05 | End: 2018-01-10 | Stop reason: HOSPADM

## 2018-01-05 RX ORDER — MELATONIN
2000 DAILY
Status: DISCONTINUED | OUTPATIENT
Start: 2018-01-05 | End: 2018-01-10 | Stop reason: HOSPADM

## 2018-01-05 RX ADMIN — ATORVASTATIN CALCIUM 10 MG: 10 TABLET, FILM COATED ORAL at 15:33

## 2018-01-05 RX ADMIN — LISINOPRIL 10 MG: 10 TABLET ORAL at 15:35

## 2018-01-05 RX ADMIN — VITAMIN D, TAB 1000IU (100/BT) 2000 UNITS: 25 TAB at 15:35

## 2018-01-05 RX ADMIN — APIXABAN 2.5 MG: 2.5 TABLET, FILM COATED ORAL at 21:14

## 2018-01-05 RX ADMIN — METOPROLOL TARTRATE 25 MG: 25 TABLET ORAL at 14:49

## 2018-01-05 RX ADMIN — SODIUM CHLORIDE 75 ML/HR: 0.9 INJECTION, SOLUTION INTRAVENOUS at 14:50

## 2018-01-05 RX ADMIN — SODIUM CHLORIDE 1000 ML: 0.9 INJECTION, SOLUTION INTRAVENOUS at 07:32

## 2018-01-05 RX ADMIN — CEFEPIME HYDROCHLORIDE 2000 MG: 2 INJECTION, POWDER, FOR SOLUTION INTRAVENOUS at 09:26

## 2018-01-05 NOTE — ASSESSMENT & PLAN NOTE
· Of bilateral LE, painless, reportedly chronic   · Low suspicion of cellulitis, hold off on abx for now   · Serial skin exams   · Local skin care

## 2018-01-05 NOTE — ED NOTES
Orthostatic BP not done due to pt feeling that she is unable to stand at this time     Nicole Anderson RN  01/05/18 2823

## 2018-01-05 NOTE — ASSESSMENT & PLAN NOTE
· Tmax 100 7 degF   · Follow up blood cultures   · UA benign   · CXR negative for infiltrate, pt reports chronic cough   · Flu PCR negative for viral illness   · Prn tylenol

## 2018-01-05 NOTE — ED PROVIDER NOTES
History  Chief Complaint   Patient presents with    Dizziness     Pt reports dizziness for the last 2 days  No CP or SOB, did not fall at all      80year-old female with history of hypertension, AFib currently on Eliquis, hyperlipidemia and hypothyroidism who presents for evaluation of feeling warm and dizziness  Patient comes from the nursing home with report of 3 days of room spinning sensation, worse with ambulation and newly resolves when lying flat  Subjectively she has felt warm but not had an AV chills or rigors  She has not had any vomiting or diarrhea  She has been tolerating a regular diet  She denies any chest pain shortness of breath  No headache vision changes difficulty swallowing or change in voice  She has not had any falls  She typically walks short distances with a walker but typically has been requiring a wheelchair over the past 3 days  No recent illness but does describe 3 days ago she had loose stool x2  No blood  She denies any recent medication changes  She denies any near syncopal, syncopal episodes  She denies any black or tarry stools  Prior to Admission Medications   Prescriptions Last Dose Informant Patient Reported? Taking? Cholecalciferol (VITAMIN D) 2000 units tablet   Yes Yes   Sig: Take 2,000 Units by mouth daily   acetaminophen (TYLENOL) 500 mg tablet   Yes Yes   Sig: Take 500 mg by mouth every 6 (six) hours as needed for mild pain   apixaban (ELIQUIS) 2 5 mg   Yes Yes   Sig: Take by mouth 2 (two) times a day   atorvastatin (LIPITOR) 10 mg tablet   Yes Yes   Sig: Take 10 mg by mouth daily     clotrimazole (LOTRIMIN) 1 % cream   Yes Yes   Sig: Apply topically 2 (two) times a day   levothyroxine 25 mcg tablet   Yes Yes   Sig: Take 25 mcg by mouth every morning     lisinopril (ZESTRIL) 10 mg tablet   Yes Yes   Sig: Take 10 mg by mouth daily   metoprolol tartrate (LOPRESSOR) 25 mg tablet   Yes Yes   Sig: Take 25 mg by mouth daily   torsemide (DEMADEX) 10 mg tablet   Yes Yes   Sig: Take 10 mg by mouth as needed      Facility-Administered Medications: None       Past Medical History:   Diagnosis Date    Diabetes mellitus ([address] )     Disease of thyroid gland     Hyperlipidemia     Hypertension     Pacemaker        Past Surgical History:   Procedure Laterality Date    APPENDECTOMY  65       Family History   Problem Relation Age of Onset    Arthritis Mother     Diabetes Mother     Hypertension Mother     Heart disease Mother     Kidney disease Father      I have reviewed and agree with the history as documented  Social History   Substance Use Topics    Smoking status: Never Smoker    Smokeless tobacco: Never Used    Alcohol use No        Review of Systems   Constitutional: Negative for chills, fatigue and fever  HENT: Negative for congestion  Eyes: Negative for visual disturbance  Respiratory: Negative for cough, chest tightness, shortness of breath and wheezing  Cardiovascular: Negative for chest pain and palpitations  Gastrointestinal: Positive for diarrhea  Negative for abdominal pain, constipation, nausea and vomiting  Genitourinary: Negative for dysuria, flank pain, frequency and urgency  Musculoskeletal: Negative for arthralgias, back pain and gait problem  Skin: Negative for rash  Allergic/Immunologic: Negative for immunocompromised state  Neurological: Negative for dizziness, syncope, weakness, light-headedness, numbness and headaches         Physical Exam  ED Triage Vitals [01/05/18 0555]   Temperature Pulse Respirations Blood Pressure SpO2   99 °F (37 2 °C) 75 18 (!) 185/78 93 %      Temp Source Heart Rate Source Patient Position - Orthostatic VS BP Location FiO2 (%)   Oral Monitor Lying Right arm --      Pain Score       No Pain           Orthostatic Vital Signs  Vitals:    01/05/18 1245 01/05/18 1330 01/05/18 1453 01/05/18 1515   BP: 153/67 142/66 154/69 165/72   Pulse: 72 60 67 68   Patient Position - Orthostatic VS: Sitting Sitting Sitting Sitting       Physical Exam   Constitutional: She is oriented to person, place, and time  Vital signs are normal  She appears well-developed and well-nourished  She does not appear ill  No distress  HENT:   Head: Normocephalic and atraumatic  Head is without abrasion and without contusion  Right Ear: Tympanic membrane normal    Left Ear: Tympanic membrane normal    Nose: Nose normal    Mouth/Throat: Uvula is midline, oropharynx is clear and moist and mucous membranes are normal    Eyes: Conjunctivae and EOM are normal  Pupils are equal, round, and reactive to light  Neck: Trachea normal, normal range of motion, full passive range of motion without pain and phonation normal  Neck supple  No JVD present  No spinous process tenderness and no muscular tenderness present  Carotid bruit is not present  Normal range of motion present  No thyromegaly present  Cardiovascular: Normal rate, regular rhythm and intact distal pulses  Exam reveals no friction rub  No murmur heard  Pulmonary/Chest: Effort normal and breath sounds normal  No accessory muscle usage or stridor  No tachypnea  No respiratory distress  She has no decreased breath sounds  She has no wheezes  She has no rhonchi  She has no rales  She exhibits no tenderness, no crepitus, no edema and no retraction  Abdominal: Soft  Normal appearance and bowel sounds are normal  She exhibits no distension  There is no tenderness  There is no rigidity, no rebound, no guarding and no CVA tenderness  Musculoskeletal: Normal range of motion  Lymphadenopathy:     She has no cervical adenopathy  Neurological: She is alert and oriented to person, place, and time  She has normal strength  She is not disoriented  No cranial nerve deficit or sensory deficit  GCS eye subscore is 4  GCS verbal subscore is 5  GCS motor subscore is 6  Unremarkable cranial nerve exam  Pupils 4 mm equal round reactive to light   No nystagmus, normal extraocular motion  5 out of 5 upper and lower extremity strength  No subjective sensory deficits to the face, upper or lower extremity  Normal finger-nose and heel shin  Unable to ambulate secondary to disequilibrium  Unremarkable hints exam     Skin: Skin is warm, dry and intact  No rash noted  She is not diaphoretic  No pallor  Psychiatric: She has a normal mood and affect  Nursing note and vitals reviewed        ED Medications  Medications   acetaminophen (TYLENOL) tablet 488 mg (not administered)   apixaban (ELIQUIS) tablet 2 5 mg (not administered)   atorvastatin (LIPITOR) tablet 10 mg (10 mg Oral Given 1/5/18 1533)   cholecalciferol (VITAMIN D3) tablet 2,000 Units (2,000 Units Oral Given 1/5/18 1535)   clotrimazole (LOTRIMIN) 1 % cream (not administered)   levothyroxine tablet 25 mcg (not administered)   lisinopril (ZESTRIL) tablet 10 mg (10 mg Oral Given 1/5/18 1535)   metoprolol tartrate (LOPRESSOR) tablet 25 mg (25 mg Oral Given 1/5/18 1449)   sodium chloride 0 9 % infusion (75 mL/hr Intravenous New Bag 1/5/18 1450)   docusate sodium (COLACE) capsule 100 mg (not administered)   ondansetron (ZOFRAN) injection 4 mg (not administered)   aluminum-magnesium hydroxide-simethicone (MYLANTA) 200-200-20 mg/5 mL oral suspension 30 mL (not administered)   meclizine (ANTIVERT) tablet 25 mg (not administered)   insulin lispro (HumaLOG) 100 units/mL subcutaneous injection 1-5 Units (not administered)   insulin lispro (HumaLOG) 100 units/mL subcutaneous injection 1-5 Units (not administered)   sodium chloride 0 9 % bolus 1,000 mL (0 mL Intravenous Stopped 1/5/18 9194)   cefepime (MAXIPIME) 2 g/50 mL dextrose IVPB (0 mg Intravenous Stopped 1/5/18 6917)       Diagnostic Studies  Results Reviewed     Procedure Component Value Units Date/Time    MRSA culture [[id number]]     Lab Status:  No result Specimen:  Nares from Nose     Influenza A/B and RSV by PCR (indicated for patients >2 mo of age) [[id number]]  (Normal) Collected: 01/05/18 0807    Lab Status:  Final result Specimen:  Nasopharyngeal from Nasopharyngeal Swab Updated:  01/05/18 1251     INFLU A PCR None Detected     INFLU B PCR None Detected     RSV PCR None Detected    Blood culture #2 [[id number]] Collected:  01/05/18 0801    Lab Status: In process Specimen:  Blood from Arm, Right Updated:  01/05/18 0809    Blood culture #1 [[id number]] Collected:  01/05/18 0804    Lab Status: In process Specimen:  Blood from Hand, Right Updated:  01/05/18 0809    Lactic acid x2 Q2H [[id number]]  (Normal) Collected:  01/05/18 0706    Lab Status:  Final result Specimen:  Blood from Arm, Left Updated:  01/05/18 0740     LACTIC ACID 1 1 mmol/L     Narrative:         Result may be elevated if tourniquet was used during collection      POCT urinalysis dipstick [[id number]]  (Normal) Resulted:  01/05/18 0726    Lab Status:  Final result Updated:  01/05/18 0727     Color, UA -    ED Urine Macroscopic [[id number]]  (Normal) Collected:  01/05/18 0727    Lab Status:  Final result Specimen:  Urine Updated:  01/05/18 0726     Color, UA Yellow     Clarity, UA Clear     pH, UA 5 5     Leukocytes, UA Negative     Nitrite, UA Negative     Protein, UA Negative mg/dl      Glucose, UA Negative mg/dl      Ketones, UA Negative mg/dl      Urobilinogen, UA 0 2 E U /dl      Bilirubin, UA Negative     Blood, UA Negative     Specific Gravity, UA 1 015    Narrative:       CLINITEK RESULT    Comprehensive metabolic panel [[id number]]  (Abnormal) Collected:  01/05/18 0559    Lab Status:  Final result Specimen:  Blood from Arm, Left Updated:  01/05/18 3201     Sodium 141 mmol/L      Potassium 3 7 mmol/L      Chloride 106 mmol/L      CO2 27 mmol/L      Anion Gap 8 mmol/L      BUN 20 mg/dL      Creatinine 1 10 mg/dL      Glucose 160 (H) mg/dL      Calcium 9 0 mg/dL      AST 21 U/L      ALT 25 U/L      Alkaline Phosphatase 88 U/L      Total Protein 7 3 g/dL      Albumin 3 4 (L) g/dL      Total Bilirubin 0 88 mg/dL      eGFR 46 ml/min/1 73sq m     Narrative:         National Kidney Disease Education Program recommendations are as follows:  GFR calculation is accurate only with a steady state creatinine  Chronic Kidney disease less than 60 ml/min/1 73 sq  meters  Kidney failure less than 15 ml/min/1 73 sq  meters  Troponin I [[id number]]  (Normal) Collected:  01/05/18 0559    Lab Status:  Final result Specimen:  Blood from Arm, Left Updated:  01/05/18 1088     Troponin I 0 02 ng/mL     Narrative:         [employer] Chemistry analyzer 99% cutoff is > 0 04 ng/mL in network labs    o cTnI 99% cutoff is useful only when applied to patients in the clinical setting of myocardial ischemia  o cTnI 99% cutoff should be interpreted in the context of clinical history, ECG findings and possibly cardiac imaging to establish correct diagnosis  o cTnI 99% cutoff may be suggestive but clearly not indicative of a coronary event without the clinical setting of myocardial ischemia  CBC and differential [[id number]]  (Abnormal) Collected:  01/05/18 0559    Lab Status:  Final result Specimen:  Blood from Arm, Left Updated:  01/05/18 0608     WBC 7 80 Thousand/uL      RBC 3 58 (L) Million/uL      Hemoglobin 11 3 (L) g/dL      Hematocrit 33 8 (L) %      MCV 94 fL      MCH 31 6 pg      MCHC 33 4 g/dL      RDW 13 7 %      MPV 10 4 fL      Platelets 965 Thousands/uL      nRBC 0 /100 WBCs      Neutrophils Relative 72 %      Lymphocytes Relative 13 (L) %      Monocytes Relative 11 %      Eosinophils Relative 4 %      Basophils Relative 0 %      Neutrophils Absolute 5 54 Thousands/µL      Lymphocytes Absolute 1 02 Thousands/µL      Monocytes Absolute 0 89 Thousand/µL      Eosinophils Absolute 0 32 Thousand/µL      Basophils Absolute 0 01 Thousands/µL                  CT head without contrast   Final Result by Sherry Mena DO (01/05 0825)      No acute intracranial abnormality           Workstation performed: [id number]         X-ray chest 2 views   Final Result by Bart Nugent MD (01/05 5389)      No active pulmonary disease  Cardiomegaly  Workstation performed: [id number]               Procedures  Procedures      Phone Consults  ED Phone Contact    ED Course  ED Course as of Jan 05 1551 Fri Jan 05, 2018   8419 EKG:  Paced rhythm, rate 67  Nonspecific ST-T changes  8054 Leukocytes, UA: Negative                         Initial Sepsis Screening     Row Name 01/05/18 1548 01/05/18 1937             Is the patient's history suggestive of a new or worsening infection?  -- (!)  Yes (Proceed)  -BB       Suspected source of infection  -- suspect infection, source unknown  -BB       Are two or more of the following signs & symptoms of infection both present and new to the patient?  No  -BB  --       Indicate SIRS criteria  --  --       If the answer is yes to both questions, suspicion of sepsis is present  --  --       If severe sepsis is present AND tissue hypoperfusion perists in the hour after fluid resuscitation or lactate > 4, the patient meets criteria for SEPTIC SHOCK  --  --       Are any of the following organ dysfunction criteria present within 6 hours of suspected infection and SIRS criteria that are NOT considered to be chronic conditions?  -- No  -BB       Organ dysfunction  --  --       Date of presentation of severe sepsis  --  --       Time of presentation of severe sepsis  --  --       Tissue hypoperfusion persists in the hour after crystalloid fluid administration, evidenced, by either:  --  --       Was hypotension present within one hour of the conclusion of crystalloid fluid administration?  --  --       Date of presentation of septic shock  --  --       Time of presentation of septic shock  --  --         User Key  (r) = Recorded By, (t) = Taken By, (c) = Cosigned By    Initials Name Provider Type    KIERRA Spangler DO Physician                  [facility] Time    Disposition  Final diagnoses:   Dizziness   Fever   Ambulatory dysfunction     Time reflects when diagnosis was documented in both MDM as applicable and the Disposition within this note     Time User Action Codes Description Comment    1/5/2018  9:12 AM Seema Vergara S Add [R42] Dizziness     1/5/2018  9:12 AM Victoria Purdue, [place] Mary Add [R50 9] Fever     1/5/2018  9:24 AM Carlo Penta Add [R26 2] Ambulatory dysfunction     1/5/2018  9:24 AM Victoria Purdue, [place] Mary Modify [R42] Dizziness     1/5/2018  9:24 AM Victoria Purdue, [place] Mary Modify [R26 2] Ambulatory dysfunction       ED Disposition     ED Disposition Condition Comment    Admit  Case was discussed with RADHA and the patient's admission status was agreed to be Admission Status: observation status to the service of Dr Landa Krabbe   Follow-up Information    None       Patient's Medications   Discharge Prescriptions    No medications on file     No discharge procedures on file  ED Provider  Attending physically available and evaluated Timothy Tee I managed the patient along with the ED Attending      Electronically Signed by         Augusta Chavez DO  Resident  01/05/18 8935

## 2018-01-05 NOTE — ED ATTENDING ATTESTATION
Erik Llamas MD, saw and evaluated the patient  I have discussed the patient with the resident/non-physician practitioner and agree with the resident's/non-physician practitioner's findings, Plan of Care, and MDM as documented in the resident's/non-physician practitioner's note, except where noted  All available labs and Radiology studies were reviewed  At this point I agree with the current assessment done in the Emergency Department  I have conducted an independent evaluation of this patient including a focused history of:    Emergency Department Note- Steven Nicolas 80 y o  female MRN: [id number]    Unit/Bed#: ED 08 Encounter: [id number]    Steven Nicolas is a 80 y o  female who presents with   Chief Complaint   Patient presents with    Dizziness     Pt reports dizziness for the last 2 days  No CP or SOB, did not fall at all  History of Present Illness   HPI:  Steven Nicolas is a 80 y o  female who presents for evaluation of:  Dizziness and nausea for 2 days  Patient's symptoms are exacerbated by any movement  Patient has felt warm but has not had documented fevers  Review of Systems   Constitutional: Positive for fatigue and fever  Respiratory: Negative for cough and shortness of breath  Cardiovascular: Negative for chest pain and palpitations  Hematological: Negative for adenopathy  Does not bruise/bleed easily  All other systems reviewed and are negative        Historical Information   Past Medical History:   Diagnosis Date    Diabetes mellitus ([address] )     Disease of thyroid gland     Hyperlipidemia     Hypertension     Pacemaker      Past Surgical History:   Procedure Laterality Date    APPENDECTOMY  1944     Social History   History   Alcohol Use No     History   Drug Use No     History   Smoking Status    Never Smoker   Smokeless Tobacco    Never Used     Family History: non-contributory    Meds/Allergies   all medications and allergies reviewed  No Known Allergies    Objective   First Vitals:   Blood Pressure: (!) 185/78 (18)  Pulse: 75 (18)  Temperature: 99 °F (37 2 °C) (18)  Temp Source: Oral (18)  Respirations: 18 (18)  Height: 5' 2" (157 5 cm) (18)  Weight - Scale: 68 kg (150 lb) (18)  SpO2: 93 % (18)    Current Vitals:   Blood Pressure: 154/69 (18 1453)  Pulse: 67 (18 1453)  Temperature: (!) 100 7 °F (38 2 °C) (18)  Temp Source: Rectal (18)  Respirations: 18 (18 1453)  Height: 5' 2" (157 5 cm) (18)  Weight - Scale: 68 kg (150 lb) (18)  SpO2: 95 % (18 1453)      Intake/Output Summary (Last 24 hours) at 18 1536  Last data filed at 18 0956   Gross per 24 hour   Intake             1050 ml   Output              300 ml   Net              750 ml       Invasive Devices     Peripheral Intravenous Line            Peripheral IV Left Antecubital -- days                Physical Exam   Constitutional: She is oriented to person, place, and time  She appears well-developed and well-nourished  HENT:   Head: Normocephalic and atraumatic  Eyes: Conjunctivae are normal  Pupils are equal, round, and reactive to light  Neck: Normal range of motion  Cardiovascular: Normal rate and regular rhythm  Pulmonary/Chest: Effort normal and breath sounds normal    Abdominal: Soft  Bowel sounds are normal    Musculoskeletal: Normal range of motion  She exhibits no deformity  Neurological: She is alert and oriented to person, place, and time  Skin: Skin is warm and dry  Psychiatric: She has a normal mood and affect  Her behavior is normal  Judgment and thought content normal    Nursing note and vitals reviewed  Medical Decision Makin  Acute asthenia, dizziness, nausea:  Plan to obtain CBC to rule out anemia; plan to obtain chest x-ray to rule out pneumonia      Recent Results (from the past 36 hour(s)) Comprehensive metabolic panel    Collection Time: 01/05/18  5:59 AM   Result Value Ref Range    Sodium 141 136 - 145 mmol/L    Potassium 3 7 3 5 - 5 3 mmol/L    Chloride 106 100 - 108 mmol/L    CO2 27 21 - 32 mmol/L    Anion Gap 8 4 - 13 mmol/L    BUN 20 5 - 25 mg/dL    Creatinine 1 10 0 60 - 1 30 mg/dL    Glucose 160 (H) 65 - 140 mg/dL    Calcium 9 0 8 3 - 10 1 mg/dL    AST 21 5 - 45 U/L    ALT 25 12 - 78 U/L    Alkaline Phosphatase 88 46 - 116 U/L    Total Protein 7 3 6 4 - 8 2 g/dL    Albumin 3 4 (L) 3 5 - 5 0 g/dL    Total Bilirubin 0 88 0 20 - 1 00 mg/dL    eGFR 46 ml/min/1 73sq m   CBC and differential    Collection Time: 01/05/18  5:59 AM   Result Value Ref Range    WBC 7 80 4 31 - 10 16 Thousand/uL    RBC 3 58 (L) 3 81 - 5 12 Million/uL    Hemoglobin 11 3 (L) 11 5 - 15 4 g/dL    Hematocrit 33 8 (L) 34 8 - 46 1 %    MCV 94 82 - 98 fL    MCH 31 6 26 8 - 34 3 pg    MCHC 33 4 31 4 - 37 4 g/dL    RDW 13 7 11 6 - 15 1 %    MPV 10 4 8 9 - 12 7 fL    Platelets 347 750 - 397 Thousands/uL    nRBC 0 /100 WBCs    Neutrophils Relative 72 43 - 75 %    Lymphocytes Relative 13 (L) 14 - 44 %    Monocytes Relative 11 4 - 12 %    Eosinophils Relative 4 0 - 6 %    Basophils Relative 0 0 - 1 %    Neutrophils Absolute 5 54 1 85 - 7 62 Thousands/µL    Lymphocytes Absolute 1 02 0 60 - 4 47 Thousands/µL    Monocytes Absolute 0 89 0 17 - 1 22 Thousand/µL    Eosinophils Absolute 0 32 0 00 - 0 61 Thousand/µL    Basophils Absolute 0 01 0 00 - 0 10 Thousands/µL   Troponin I    Collection Time: 01/05/18  5:59 AM   Result Value Ref Range    Troponin I 0 02 <=0 04 ng/mL   ECG 12 lead    Collection Time: 01/05/18  6:03 AM   Result Value Ref Range    Ventricular Rate 67 BPM    Atrial Rate 267 BPM    ID Interval  ms    QRSD Interval 128 ms    QT Interval 410 ms    QTC Interval 433 ms    P Axis  degrees    QRS Axis -70 degrees    T Wave Axis 105 degrees   Lactic acid x2 Q2H    Collection Time: 01/05/18  7:06 AM   Result Value Ref Range LACTIC ACID 1 1 0 5 - 2 0 mmol/L   POCT urinalysis dipstick    Collection Time: 01/05/18  7:26 AM   Result Value Ref Range    Color, UA -    ED Urine Macroscopic    Collection Time: 01/05/18  7:27 AM   Result Value Ref Range    Color, UA Yellow     Clarity, UA Clear     pH, UA 5 5 4 5 - 8 0    Leukocytes, UA Negative Negative    Nitrite, UA Negative Negative    Protein, UA Negative Negative mg/dl    Glucose, UA Negative Negative mg/dl    Ketones, UA Negative Negative mg/dl    Urobilinogen, UA 0 2 0 2, 1 0 E U /dl E U /dl    Bilirubin, UA Negative Negative    Blood, UA Negative Negative    Specific Gravity, UA 1 015 1 003 - 1 030   Influenza A/B and RSV by PCR (indicated for patients >2 mo of age)    Collection Time: 01/05/18  8:07 AM   Result Value Ref Range    INFLU A PCR None Detected None Detected    INFLU B PCR None Detected None Detected    RSV PCR None Detected None Detected     CT head without contrast   Final Result      No acute intracranial abnormality  Workstation performed: [id number]         X-ray chest 2 views   Final Result      No active pulmonary disease  Cardiomegaly  Workstation performed: [id number]               Portions of the record may have been created with voice recognition software  Occasional wrong word or "sound a like" substitutions may have occurred due to the inherent limitations of voice recognition software  Read the chart carefully and recognize, using context, where substitutions have occurred

## 2018-01-05 NOTE — SEPSIS NOTE
Sepsis Note   Timothy Tee 80 y o  female MRN: [id number]  Unit/Bed#: ED 08 Encounter: [id number]            Initial Sepsis Screening     Row Name 01/05/18 1548 01/05/18 0812             Is the patient's history suggestive of a new or worsening infection?  -- (!)  Yes (Proceed)  -BB       Suspected source of infection  -- suspect infection, source unknown  -BB       Are two or more of the following signs & symptoms of infection both present and new to the patient?  No  -BB  --       Indicate SIRS criteria  --  --       If the answer is yes to both questions, suspicion of sepsis is present  --  --       If severe sepsis is present AND tissue hypoperfusion perists in the hour after fluid resuscitation or lactate > 4, the patient meets criteria for SEPTIC SHOCK  --  --       Are any of the following organ dysfunction criteria present within 6 hours of suspected infection and SIRS criteria that are NOT considered to be chronic conditions?  -- No  -BB       Organ dysfunction  --  --       Date of presentation of severe sepsis  --  --       Time of presentation of severe sepsis  --  --       Tissue hypoperfusion persists in the hour after crystalloid fluid administration, evidenced, by either:  --  --       Was hypotension present within one hour of the conclusion of crystalloid fluid administration?  --  --       Date of presentation of septic shock  --  --       Time of presentation of septic shock  --  --         User Key  (r) = Recorded By, (t) = Taken By, (c) = Cosigned By    Initials Name Provider Type    KIERRA Chavez DO Physician

## 2018-01-05 NOTE — H&P
H&P- Dejon Ruby 3/6/1931, 80 y o  female MRN: [id number]    Unit/Bed#: ED 08 Encounter: [id number] DOS: 1/5/18    Primary Care Provider: Irlanda Vieyra MD   Date and time admitted to hospital: 1/5/2018  5:50 AM    * Dizziness   Assessment & Plan    · Will treat with IVF hydration, recently with diarrhea earlier this week  · prn meclizine   · Check orthostatic blood pressures   · PT/OT evaluation         Fever   Assessment & Plan    · Tmax 100 7 degF   · Follow up blood cultures   · UA benign   · CXR negative for infiltrate, pt reports chronic cough   · Flu PCR negative for viral illness   · Prn tylenol         Atrial fibrillation   Assessment & Plan    · S/p PPM in may 2017   · Continue eliquis  · Continue metoprolol   · Device check         Erythema of lower extremity   Assessment & Plan    · Of bilateral LE, painless, reportedly chronic   · Low suspicion of cellulitis, hold off on abx for now   · Serial skin exams   · Local skin care        Essential hypertension   Assessment & Plan    · Continue with lisinopril, metoprolol, hold torsemide 5mg         Hypothyroidism   Assessment & Plan    · Check TSH   · Continue synthroid         Type 2 diabetes mellitus (HCC)   Assessment & Plan    · Previously diet controlled   · SSI, accuchecks, check HA1C        Hyperlipidemia   Assessment & Plan    · Continue statin therapy           VTE Prophylaxis: Apixaban (Eliquis)  / sequential compression device   Code Status: FULL CODE  - d/w patient 1/5/18  POLST: POLST form is not discussed and not completed at this time  Discussion with family: n/a    Anticipated Length of Stay:  Patient will be admitted on an Observation basis with an anticipated length of stay of  Less than 2 midnights  Justification for Hospital Stay: workup for dizziness and fever as outlined above    Total Time for Visit, including Counseling / Coordination of Care: 30 minutes    Greater than 50% of this total time spent on direct patient counseling and coordination of care  Chief Complaint:   Dizziness x 3 days     History of Present Illness:    Aba Lynne is a 80 y o  female with PMH significant for HTN, HLD, Afib s/p PPM on eliquis, urinary incontinence, who presents with 3 day history of dizziness  Patient states "the room is spinning" and that it occurred 3 days ago after she got diarrhea from "the sauerkraut" on new years day  Patient states these episodes of dizziness occur with standing and last seconds to minutes  These episodes come and go throughout the day  She has never had this happen before  Denies history of vertigo and states she didn't try any medications for relief of her dizziness  states she would lay down and feel better  States she felt off balance with her walker  No chest pain, SOB, palpitations  No falls or LOC  She denies nasuea and vomiting but had diarrhea over the holiday for a few days  She has b/l lower extremity erythema which is normal per patient and painless  She was found to have fever of 100 7 deg F in ER and given dose of cefepime  Review of Systems:    Review of Systems   Constitutional: Positive for fever  Negative for chills  HENT: Negative for congestion  Respiratory: Negative for shortness of breath and wheezing  Cardiovascular: Negative for chest pain, palpitations and leg swelling  Gastrointestinal: Positive for diarrhea  Negative for abdominal pain, nausea and vomiting  Genitourinary: Negative for dysuria  Musculoskeletal: Positive for gait problem  Allergic/Immunologic: Negative for immunocompromised state  Neurological: Positive for dizziness  Psychiatric/Behavioral: Negative for confusion       Past Medical and Surgical History:     Past Medical History:   Diagnosis Date    Diabetes mellitus ([facility] [address] )     Disease of thyroid gland     Hyperlipidemia     Hypertension     Pacemaker        Past Surgical History:   Procedure Laterality Date    APPENDECTOMY  1944 Meds/Allergies:    Prior to Admission medications    Medication Sig Start Date End Date Taking? Authorizing Provider   acetaminophen (TYLENOL) 500 mg tablet Take 500 mg by mouth every 6 (six) hours as needed for mild pain   Yes Historical Provider, MD   apixaban (ELIQUIS) 2 5 mg Take by mouth 2 (two) times a day   Yes Historical Provider, MD   atorvastatin (LIPITOR) 10 mg tablet Take 10 mg by mouth daily  Yes Historical Provider, MD   Cholecalciferol (VITAMIN D) 2000 units tablet Take 2,000 Units by mouth daily   Yes Historical Provider, MD   clotrimazole (LOTRIMIN) 1 % cream Apply topically 2 (two) times a day   Yes Historical Provider, MD   levothyroxine 25 mcg tablet Take 25 mcg by mouth every morning     Yes Historical Provider, MD   lisinopril (ZESTRIL) 10 mg tablet Take 10 mg by mouth daily   Yes Historical Provider, MD   metoprolol tartrate (LOPRESSOR) 25 mg tablet Take 25 mg by mouth daily   Yes Historical Provider, MD   torsemide (DEMADEX) 10 mg tablet Take 10 mg by mouth as needed   Yes Historical Provider, MD   acetaminophen (TYLENOL) 325 mg tablet Take 2 tablets (650 mg total) by mouth every 6 (six) hours as needed for mild pain  4/1/16 1/5/18  Kathy Meyer MD   metFORMIN (GLUCOPHAGE) 500 mg tablet Take 1 tablet by mouth 2 (two) times a day with meals for 30 days 5/5/17 1/5/18  Natali Watson MD     I have reveiwed home medications using records provided by [facility]      Allergies: No Known Allergies    Social History:     Marital Status: /Civil Union   Occupation: unknown   Patient Pre-hospital Living Situation: at [facility]   Patient Pre-hospital Level of Mobility: walker   Patient Pre-hospital Diet Restrictions: none  Substance Use History:   History   Alcohol Use No     History   Smoking Status    Never Smoker   Smokeless Tobacco    Never Used     History   Drug Use No       Family History:    Family History   Problem Relation Age of Onset    Arthritis Mother     Diabetes Mother     Hypertension Mother     Heart disease Mother     Kidney disease Father        Physical Exam:     Vitals:   Blood Pressure: 142/66 (01/05/18 1330)  Pulse: 60 (01/05/18 1330)  Temperature: (!) 100 7 °F (38 2 °C) (01/05/18 0707)  Temp Source: Rectal (01/05/18 0707)  Respirations: 18 (01/05/18 1330)  Height: 5' 2" (157 5 cm) (01/05/18 0555)  Weight - Scale: 68 kg (150 lb) (01/05/18 0555)  SpO2: 92 % (01/05/18 1330)    Physical Exam   Constitutional: She is oriented to person, place, and time  She appears well-developed and well-nourished  No distress  HENT:   Head: Normocephalic and atraumatic  MM dry, yellowish film overlying surface of tongue    Eyes: EOM are normal  No scleral icterus  Neck: Normal range of motion  Neck supple  Cardiovascular: Normal rate, regular rhythm and normal heart sounds  No murmur heard  Pulmonary/Chest: Effort normal and breath sounds normal  No respiratory distress  She has no wheezes  She has no rales  Abdominal: Soft  Bowel sounds are normal  She exhibits no distension  There is no tenderness  Musculoskeletal: Normal range of motion  She exhibits no edema  Neurological: She is alert and oriented to person, place, and time  Initially states she is at her nursing home but quickly corrects herself and is orientated to self, location, and time  She moves all extremities x 4, speech is clear, no facial droop appreciated,  strength intact, b/l UE and LE strength 4-5/5 proximally and distally  Skin: Skin is warm and dry  There is erythema (b/l lower extremities )  Psychiatric: She has a normal mood and affect  Nursing note and vitals reviewed  Additional Data:     Lab Results: I have personally reviewed pertinent reports          Results from last 7 days  Lab Units 01/05/18  0559   WBC Thousand/uL 7 80   HEMOGLOBIN g/dL 11 3*   HEMATOCRIT % 33 8*   PLATELETS Thousands/uL 187   NEUTROS PCT % 72   LYMPHS PCT % 13*   MONOS PCT % 11   EOS PCT % 4 Results from last 7 days  Lab Units 01/05/18  0559   SODIUM mmol/L 141   POTASSIUM mmol/L 3 7   CHLORIDE mmol/L 106   CO2 mmol/L 27   BUN mg/dL 20   CREATININE mg/dL 1 10   CALCIUM mg/dL 9 0   TOTAL PROTEIN g/dL 7 3   BILIRUBIN TOTAL mg/dL 0 88   ALK PHOS U/L 88   ALT U/L 25   AST U/L 21   GLUCOSE RANDOM mg/dL 160*           Imaging: I have personally reviewed pertinent reports  CT head without contrast   Final Result by Devi Shahid DO (01/05 1771)      No acute intracranial abnormality  Workstation performed: [id number]         X-ray chest 2 views   Final Result by Tree Long MD (01/05 4514)      No active pulmonary disease  Cardiomegaly  Workstation performed: [id number]             EKG, Pathology, and Other Studies Reviewed on Admission:   · EKG: none to reivew, paced on tele     Allscripts / Epic Records Reviewed: Yes     ** Please Note: This note has been constructed using a voice recognition system   **

## 2018-01-05 NOTE — ASSESSMENT & PLAN NOTE
· Will treat with IVF hydration, recently with diarrhea earlier this week  · prn meclizine   · Check orthostatic blood pressures   · PT/OT evaluation

## 2018-01-06 LAB
ALBUMIN SERPL BCP-MCNC: 2.9 G/DL (ref 3.5–5)
ALP SERPL-CCNC: 109 U/L (ref 46–116)
ALT SERPL W P-5'-P-CCNC: 25 U/L (ref 12–78)
ANION GAP SERPL CALCULATED.3IONS-SCNC: 9 MMOL/L (ref 4–13)
AST SERPL W P-5'-P-CCNC: 22 U/L (ref 5–45)
BILIRUB SERPL-MCNC: 0.83 MG/DL (ref 0.2–1)
BUN SERPL-MCNC: 14 MG/DL (ref 5–25)
CALCIUM SERPL-MCNC: 8.4 MG/DL (ref 8.3–10.1)
CHLORIDE SERPL-SCNC: 109 MMOL/L (ref 100–108)
CO2 SERPL-SCNC: 25 MMOL/L (ref 21–32)
CREAT SERPL-MCNC: 0.94 MG/DL (ref 0.6–1.3)
ERYTHROCYTE [DISTWIDTH] IN BLOOD BY AUTOMATED COUNT: 13.7 % (ref 11.6–15.1)
EST. AVERAGE GLUCOSE BLD GHB EST-MCNC: 171 MG/DL
GFR SERPL CREATININE-BSD FRML MDRD: 55 ML/MIN/1.73SQ M
GLUCOSE SERPL-MCNC: 101 MG/DL (ref 65–140)
GLUCOSE SERPL-MCNC: 109 MG/DL (ref 65–140)
GLUCOSE SERPL-MCNC: 144 MG/DL (ref 65–140)
GLUCOSE SERPL-MCNC: 159 MG/DL (ref 65–140)
GLUCOSE SERPL-MCNC: 172 MG/DL (ref 65–140)
HBA1C MFR BLD: 7.6 % (ref 4.2–6.3)
HCT VFR BLD AUTO: 32.5 % (ref 34.8–46.1)
HGB BLD-MCNC: 10.5 G/DL (ref 11.5–15.4)
MAGNESIUM SERPL-MCNC: 2 MG/DL (ref 1.6–2.6)
MCH RBC QN AUTO: 30.7 PG (ref 26.8–34.3)
MCHC RBC AUTO-ENTMCNC: 32.3 G/DL (ref 31.4–37.4)
MCV RBC AUTO: 95 FL (ref 82–98)
PLATELET # BLD AUTO: 181 THOUSANDS/UL (ref 149–390)
PMV BLD AUTO: 10 FL (ref 8.9–12.7)
POTASSIUM SERPL-SCNC: 3.7 MMOL/L (ref 3.5–5.3)
PROT SERPL-MCNC: 6.6 G/DL (ref 6.4–8.2)
RBC # BLD AUTO: 3.42 MILLION/UL (ref 3.81–5.12)
SODIUM SERPL-SCNC: 143 MMOL/L (ref 136–145)
TSH SERPL DL<=0.05 MIU/L-ACNC: 2.22 UIU/ML (ref 0.36–3.74)
WBC # BLD AUTO: 7.69 THOUSAND/UL (ref 4.31–10.16)

## 2018-01-06 PROCEDURE — 85027 COMPLETE CBC AUTOMATED: CPT | Performed by: PHYSICIAN ASSISTANT

## 2018-01-06 PROCEDURE — 82948 REAGENT STRIP/BLOOD GLUCOSE: CPT

## 2018-01-06 PROCEDURE — 83036 HEMOGLOBIN GLYCOSYLATED A1C: CPT | Performed by: PHYSICIAN ASSISTANT

## 2018-01-06 PROCEDURE — 80053 COMPREHEN METABOLIC PANEL: CPT | Performed by: PHYSICIAN ASSISTANT

## 2018-01-06 PROCEDURE — 84443 ASSAY THYROID STIM HORMONE: CPT | Performed by: PHYSICIAN ASSISTANT

## 2018-01-06 PROCEDURE — 83735 ASSAY OF MAGNESIUM: CPT | Performed by: PHYSICIAN ASSISTANT

## 2018-01-06 RX ORDER — GUAIFENESIN 600 MG
600 TABLET, EXTENDED RELEASE 12 HR ORAL EVERY 12 HOURS SCHEDULED
Status: DISCONTINUED | OUTPATIENT
Start: 2018-01-06 | End: 2018-01-10 | Stop reason: HOSPADM

## 2018-01-06 RX ORDER — LEVALBUTEROL 1.25 MG/.5ML
1.25 SOLUTION, CONCENTRATE RESPIRATORY (INHALATION) EVERY 8 HOURS PRN
Status: DISCONTINUED | OUTPATIENT
Start: 2018-01-06 | End: 2018-01-06

## 2018-01-06 RX ORDER — ALBUTEROL SULFATE 2.5 MG/3ML
2.5 SOLUTION RESPIRATORY (INHALATION) EVERY 4 HOURS PRN
Status: DISCONTINUED | OUTPATIENT
Start: 2018-01-06 | End: 2018-01-10

## 2018-01-06 RX ADMIN — APIXABAN 2.5 MG: 2.5 TABLET, FILM COATED ORAL at 11:29

## 2018-01-06 RX ADMIN — INSULIN LISPRO 1 UNITS: 100 INJECTION, SOLUTION INTRAVENOUS; SUBCUTANEOUS at 16:51

## 2018-01-06 RX ADMIN — INSULIN LISPRO 1 UNITS: 100 INJECTION, SOLUTION INTRAVENOUS; SUBCUTANEOUS at 21:19

## 2018-01-06 RX ADMIN — LEVOTHYROXINE SODIUM 25 MCG: 25 TABLET ORAL at 06:15

## 2018-01-06 RX ADMIN — APIXABAN 2.5 MG: 2.5 TABLET, FILM COATED ORAL at 16:51

## 2018-01-06 RX ADMIN — VITAMIN D, TAB 1000IU (100/BT) 2000 UNITS: 25 TAB at 11:28

## 2018-01-06 RX ADMIN — METOPROLOL TARTRATE 25 MG: 25 TABLET ORAL at 11:28

## 2018-01-06 RX ADMIN — GUAIFENESIN 600 MG: 600 TABLET, EXTENDED RELEASE ORAL at 21:06

## 2018-01-06 RX ADMIN — DOCUSATE SODIUM 100 MG: 100 CAPSULE, LIQUID FILLED ORAL at 11:29

## 2018-01-06 RX ADMIN — CLOTRIMAZOLE: 10 CREAM TOPICAL at 11:29

## 2018-01-06 RX ADMIN — LISINOPRIL 10 MG: 10 TABLET ORAL at 11:28

## 2018-01-06 RX ADMIN — ATORVASTATIN CALCIUM 10 MG: 10 TABLET, FILM COATED ORAL at 11:28

## 2018-01-06 RX ADMIN — CLOTRIMAZOLE: 10 CREAM TOPICAL at 21:06

## 2018-01-06 NOTE — PHYSICIAN ADVISOR
Current patient class: Observation  The patient is currently on Hospital Day: 2    This patient was originally admitted to the hospital under observation status  After admission the patient was reevaluated and determined to require further hospitalization  The patient is now documented to require at least a 2nd midnight in the hospital  As such the patient is now expected to satisfy the 2 midnight benchmark and is therefore eligible for inpatient admission  After review of the relevant documentation, labs, vital signs and test results, the patient is appropriate for INPATIENT ADMISSION  Admission to the hospital as an inpatient is a complex decision making process which requires the practitioner to consider the patients presenting complaint, history and physical examination and all relevant testing  With this in mind, in this case, the patient was deemed appropriate for INPATIENT ADMISSION  After review of the documentation and testing available at the time of the admission I concur with this clinical determination of medical necessity  Rationale is as follows: The patient is a 80 yrs Female who presented to the ED at 1/5/2018  5:50 AM with a chief complaint of Dizziness (Pt reports dizziness for the last 2 days  No CP or SOB, did not fall at all )     Patient continues to remain hospitalized for fever, dizziness, and erythema of the lower extremities  Patient is being monitored off antibiotics, with IV fluids, and management of fever  Patient is expected to remain hospitalized for at least a 2nd midnight for continued management of the above  Given this, the patient is appropriate for change in status to inpatient admission      The patients vitals on arrival were ED Triage Vitals [01/05/18 0555]   Temperature Pulse Respirations Blood Pressure SpO2   99 °F (37 2 °C) 75 18 (!) 185/78 93 %      Temp Source Heart Rate Source Patient Position - Orthostatic VS BP Location FiO2 (%)   Oral Monitor Lying Right arm --      Pain Score       No Pain           Past Medical History:   Diagnosis Date    Diabetes mellitus ([facility])     Disease of thyroid gland     Hyperlipidemia     Hypertension     Pacemaker      Past Surgical History:   Procedure Laterality Date    APPENDECTOMY  1944       The patient was admitted to the hospital at N/A on N/A for the following diagnosis:  Dizziness [R42]  Fever [R50 9]  Ambulatory dysfunction [R26 2]    Consults have been placed to:   None    Vitals:    01/05/18 1925 01/05/18 2305 01/06/18 0739 01/06/18 1505   BP: 121/91 147/75 149/81 142/78   Pulse: 71 71 60 62   Resp: 18 18 18 18   Temp:  98 6 °F (37 °C) 98 3 °F (36 8 °C) 99 2 °F (37 3 °C)   TempSrc:  Oral Oral Oral   SpO2: 91% 94% 96% 94%   Weight:       Height:           Most recent labs:    Recent Labs      01/05/18   0559  01/06/18   0718   WBC  7 80  7 69   HGB  11 3*  10 5*   HCT  33 8*  32 5*   PLT  187  181   K  3 7  3 7   NA  141  143   CALCIUM  9 0  8 4   BUN  20  14   CREATININE  1 10  0 94   TROPONINI  0 02   --    AST  21  22   ALT  25  25   ALKPHOS  88  109   BILITOT  0 88  0 83       Scheduled Meds:  apixaban 2 5 mg Oral BID   atorvastatin 10 mg Oral Daily   cholecalciferol 2,000 Units Oral Daily   clotrimazole  Topical BID   docusate sodium 100 mg Oral BID   guaiFENesin 600 mg Oral Q12H DEVI   insulin lispro 1-5 Units Subcutaneous TID AC   insulin lispro 1-5 Units Subcutaneous HS   levothyroxine 25 mcg Oral Early Morning   lisinopril 10 mg Oral Daily   metoprolol tartrate 25 mg Oral Daily     Continuous Infusions:  sodium chloride 75 mL/hr Last Rate: 75 mL/hr (01/05/18 1450)     PRN Meds:   acetaminophen    albuterol    aluminum-magnesium hydroxide-simethicone    meclizine    ondansetron    pneumococcal 13-valent conjugate vaccine    Surgical procedures (if appropriate):

## 2018-01-06 NOTE — SOCIAL WORK
Patient is a resident @ [facility] in [facility] [address] and uses a wheelchair, has RW with a seat  Receives assistance with ADLs showering, dressing  Pt uses the [address] [place] [address] for transportation  Medication, nursing needs and therapy is provided via the facility  Per pt's son Gregory Weinberg is for patient to return for resumption of care when cleared  T/c to facility unanswered  Emergency contact is patient's son, Eric Parks, [phone]

## 2018-01-06 NOTE — ASSESSMENT & PLAN NOTE
· Previously diet controlled   · SSI, accuchecks, check [id number] 6  · Blood sugars in the low 100s    · Continue to monitor

## 2018-01-06 NOTE — ASSESSMENT & PLAN NOTE
· Continue with lisinopril, metoprolol,   · hold torsemide 5mg   · Monitor blood pressure  Currently systolic in the 613O range

## 2018-01-06 NOTE — RESPIRATORY THERAPY NOTE
RT Protocol Note  Martha Wilder 80 y o  female MRN: [id number]  Unit/Bed#: [facility] 717-01 Encounter: [id number]    Assessment    Principal Problem:    Fever  Active Problems:    Hyperlipemia    Essential hypertension    Hypothyroidism    Atrial fibrillation    Type 2 diabetes mellitus (HCC)    Dizziness    Erythema of lower extremity    Hyperlipidemia      Home Pulmonary Medications:  none    Past Medical History:   Diagnosis Date    Diabetes mellitus ([address] )     Disease of thyroid gland     Hyperlipidemia     Hypertension     Pacemaker      Social History     Social History    Marital status: /Civil Union     Spouse name: N/A    Number of children: N/A    Years of education: N/A     Social History Main Topics    Smoking status: Never Smoker    Smokeless tobacco: Never Used    Alcohol use No    Drug use: No    Sexual activity: No     Other Topics Concern    None     Social History Narrative    None       Subjective         Objective    Physical Exam:   Assessment Type: (P) Assess only  General Appearance: (P) Alert, Awake  Respiratory Pattern: (P) Normal  Chest Assessment: (P) Chest expansion symmetrical  Bilateral Breath Sounds: (P) Clear    Vitals:  Blood pressure 149/81, pulse 60, temperature 98 3 °F (36 8 °C), temperature source Oral, resp  rate 18, height 5' 2" (1 575 m), weight 70 9 kg (156 lb 4 9 oz), SpO2 96 %  Imaging and other studies: I have personally reviewed pertinent reports  Plan    Respiratory Plan: (P) No distress/Pulmonary history        Resp Comments: (P) Pt  with no pulm  hx  No meds at home  States she's been coughing up a lot of mucus  SOB at times  BS are currently clear  CXR is clear  Will continue udn prn and reassess in 24 hrs

## 2018-01-06 NOTE — PLAN OF CARE
Problem: DISCHARGE PLANNING - CARE MANAGEMENT  Goal: Discharge to post-acute care or home with appropriate resources  INTERVENTIONS:  - Conduct assessment to determine patient/family and health care team treatment goals, and need for post-acute services based on payer coverage, community resources, and patient preferences, and barriers to discharge  - Address psychosocial, clinical, and financial barriers to discharge as identified in assessment in conjunction with the patient/family and health care team  - Arrange appropriate level of post-acute services according to patient's   needs and preference and payer coverage in collaboration with the physician and health care team  - Communicate with and update the patient/family, physician, and health care team regarding progress on the discharge plan  - Arrange appropriate transportation to post-acute venues   - Anticipate return to [facility]  Outcome: Progressing

## 2018-01-06 NOTE — ASSESSMENT & PLAN NOTE
· Tmax 100 7 degF   · Follow up blood cultures   · UA benign   · CXR negative for infiltrate, pt reports chronic cough   · Flu PCR negative for viral illness   · Prn tylenol   · May be contributing to primary symptoms of dizziness as well

## 2018-01-06 NOTE — PROGRESS NOTES
Progress Note - An Coleman 3/6/1931, 80 y o  female MRN: [id number]    Unit/Bed#: [facility] 540-05 Encounter: [id number]    Primary Care Provider: Daniele Thomason MD   Date and time admitted to hospital: 1/5/2018  5:50 AM        * Fever   Assessment & Plan    · Tmax 100 7 degF   · Follow up blood cultures   · UA benign   · CXR negative for infiltrate, pt reports chronic cough   · Flu PCR negative for viral illness   · Prn tylenol   · May be contributing to primary symptoms of dizziness as well  Dizziness   Assessment & Plan    · Will treat with IVF hydration, recently with diarrhea earlier this week  · prn meclizine   · Check orthostatic blood pressures   · PT/OT evaluation         Hyperlipidemia   Assessment & Plan    · Continue statin therapy         Erythema of lower extremity   Assessment & Plan    · Of bilateral LE, painless, reportedly chronic   · Low suspicion of cellulitis, hold off on abx for now   · Serial skin exams   · Local skin care        Type 2 diabetes mellitus (HCC)   Assessment & Plan    · Previously diet controlled   · SSI, accuchecks, check [id number]--9 6  · Blood sugars in the low 100s  · Continue to monitor        Atrial fibrillation   Assessment & Plan    · S/p PPM in may 2017   · Continue eliquis  · Continue metoprolol   · Device check         Essential hypertension   Assessment & Plan    · Continue with lisinopril, metoprolol,   · hold torsemide 5mg   · Monitor blood pressure  Currently systolic in the 300H range  Hyperlipemia   Assessment & Plan    Lipitor        Hypothyroidism   Assessment & Plan    · Check TSH   · Continue synthroid             VTE Pharmacologic Prophylaxis:   Pharmacologic: Apixaban (Eliquis)  Mechanical VTE Prophylaxis in Place: Yes    Patient Centered Rounds: I have performed bedside rounds with nursing staff today  Time Spent for Care: 15 minutes    More than 50% of total time spent on counseling and coordination of care as described above     Current Length of Stay: 0 day(s)    Current Patient Status: Observation   Certification Statement: The patient will continue to require additional inpatient hospital stay due to Need to monitor symptoms        Code Status: Level 1 - Full Code      Subjective:   Patient with wheezing  Objective:     Vitals:   Temp (24hrs), Av 9 °F (37 2 °C), Min:98 3 °F (36 8 °C), Max:99 7 °F (37 6 °C)    HR:  [59-71] 60  Resp:  [18] 18  BP: (121-165)/(66-97) 149/81  SpO2:  [91 %-97 %] 96 %  Body mass index is 28 59 kg/m²  Input and Output Summary (last 24 hours): Intake/Output Summary (Last 24 hours) at 18 1407  Last data filed at 18 1101   Gross per 24 hour   Intake           758 75 ml   Output                0 ml   Net           758 75 ml       Physical Exam:     Physical Exam   Constitutional: She is oriented to person, place, and time  She appears well-developed and well-nourished  HENT:   Head: Normocephalic and atraumatic  Eyes: Conjunctivae are normal  Pupils are equal, round, and reactive to light  Neck: Normal range of motion  Neck supple  No tracheal deviation present  No thyromegaly present  Cardiovascular: Normal rate and regular rhythm  Pulmonary/Chest: She has wheezes  She has no rales  Abdominal: Soft  She exhibits no distension  There is no tenderness  Musculoskeletal: Normal range of motion  She exhibits no edema  Neurological: She is alert and oriented to person, place, and time  Skin: Skin is warm and dry  No erythema  Psychiatric: She has a normal mood and affect         Additional Data:     Labs:      Results from last 7 days  Lab Units 18  0718 18  0559   WBC Thousand/uL 7 69 7 80   HEMOGLOBIN g/dL 10 5* 11 3*   HEMATOCRIT % 32 5* 33 8*   PLATELETS Thousands/uL 181 187   NEUTROS PCT %  --  72   LYMPHS PCT %  --  13*   MONOS PCT %  --  11   EOS PCT %  --  4       Results from last 7 days  Lab Units 18  0718   SODIUM mmol/L 143 POTASSIUM mmol/L 3 7   CHLORIDE mmol/L 109*   CO2 mmol/L 25   BUN mg/dL 14   CREATININE mg/dL 0 94   CALCIUM mg/dL 8 4   TOTAL PROTEIN g/dL 6 6   BILIRUBIN TOTAL mg/dL 0 83   ALK PHOS U/L 109   ALT U/L 25   AST U/L 22   GLUCOSE RANDOM mg/dL 109           * I Have Reviewed All Lab Data Listed Above  * Additional Pertinent Lab Tests Reviewed: All Labs Within Last 24 Hours Reviewed        Recent Cultures (last 7 days):       Results from last 7 days  Lab Units 01/05/18  0807 01/05/18  0804 01/05/18  0801   BLOOD CULTURE   --  No Growth at 24 hrs  No Growth at 24 hrs  INFLUENZA A PCR  None Detected  --   --    INFLUENZA B PCR  None Detected  --   --    RSV PCR  None Detected  --   --        Last 24 Hours Medication List:     apixaban 2 5 mg Oral BID   atorvastatin 10 mg Oral Daily   cholecalciferol 2,000 Units Oral Daily   clotrimazole  Topical BID   docusate sodium 100 mg Oral BID   insulin lispro 1-5 Units Subcutaneous TID AC   insulin lispro 1-5 Units Subcutaneous HS   levothyroxine 25 mcg Oral Early Morning   lisinopril 10 mg Oral Daily   metoprolol tartrate 25 mg Oral Daily        Today, Patient Was Seen By: Barry Lenz DO    ** Please Note: Dictation voice to text software may have been used in the creation of this document   **

## 2018-01-06 NOTE — CASE MANAGEMENT
Initial Clinical Review    Admission: Date/Time/Statement: 01/05/18 @ 0915 Observation Written     Orders Placed This Encounter   Procedures    Place in Observation (expected length of stay for this patient is less than two midnights)     Standing Status:   Standing     Number of Occurrences:   1     Order Specific Question:   Admitting Physician     Answer:   Marla Moore     Order Specific Question:   Level of Care     Answer:   Med Surg [16]         ED: Date/Time/Mode of Arrival:   ED Arrival Information     Expected Arrival Acuity Means of Arrival Escorted By Service Admission Type    - 1/5/2018 05:49 Emergent Ambulance [address]  Ambulance General Medicine Emergency    Arrival Complaint    Dizziness          Chief Complaint:   Chief Complaint   Patient presents with    Dizziness     Pt reports dizziness for the last 2 days  No CP or SOB, did not fall at all  History of Illness: Leah Barnard is a 80 y o  female with PMH significant for HTN, HLD, Afib s/p PPM on eliquis, urinary incontinence, who presents with 3 day history of dizziness  Patient states "the room is spinning" and that it occurred 3 days ago after she got diarrhea from "the sauerkraut" on new years day  Patient states these episodes of dizziness occur with standing and last seconds to minutes  These episodes come and go throughout the day  She has never had this happen before  Denies history of vertigo and states she didn't try any medications for relief of her dizziness  states she would lay down and feel better  States she felt off balance with her walker  No chest pain, SOB, palpitations  No falls or LOC  She denies nasuea and vomiting but had diarrhea over the holiday for a few days  She has b/l lower extremity erythema which is normal per patient and painless  She was found to have fever of 100 7 deg F in ER and given dose of cefepime       ED Vital Signs:   ED Triage Vitals [01/05/18 0555]   Temperature Pulse Respirations Blood Pressure SpO2   99 °F (37 2 °C) 75 18 (!) 185/78 93 %      Temp Source Heart Rate Source Patient Position - Orthostatic VS BP Location FiO2 (%)   Oral Monitor Lying Right arm --      Pain Score       No Pain        Wt Readings from Last 1 Encounters:   01/05/18 70 9 kg (156 lb 4 9 oz)       Vital Signs (abnormal): temp 99  Abnormal Labs/Diagnostic Test Results:   HEMOGLOBIN 11 3*   HEMATOCRIT 33 8*   LYMPHS PCT 13*     GLUCOSE RANDOM 160*     CT head: WNL  CXR:  NAD  EKG: paced    ED Treatment:   Medication Administration from 01/05/2018 0549 to 01/05/2018 1819       Date/Time Order Dose Route Action     01/05/2018 0732 sodium chloride 0 9 % bolus 1,000 mL 1,000 mL Intravenous New Bag     01/05/2018 0926 cefepime (MAXIPIME) 2 g/50 mL dextrose IVPB 2,000 mg Intravenous New Bag     01/05/2018 1533 atorvastatin (LIPITOR) tablet 10 mg 10 mg Oral Given     01/05/2018 1535 cholecalciferol (VITAMIN D3) tablet 2,000 Units 2,000 Units Oral Given     01/05/2018 1535 lisinopril (ZESTRIL) tablet 10 mg 10 mg Oral Given     01/05/2018 1449 metoprolol tartrate (LOPRESSOR) tablet 25 mg 25 mg Oral Given     01/05/2018 1450 sodium chloride 0 9 % infusion 75 mL/hr Intravenous New Bag          Past Medical/Surgical History:    Active Ambulatory Problems     Diagnosis Date Noted    Hyperlipemia 03/27/2016    Essential hypertension 03/27/2016    Hypothyroidism 03/27/2016    Atrial fibrillation 04/01/2016    [facility] 05/02/2017    Status post placement of cardiac pacemaker 05/05/2017     Resolved Ambulatory Problems     Diagnosis Date Noted    Leg swelling 03/27/2016    Cellulitis 03/27/2016    Weakness 05/02/2017    Bradycardia, drug induced 05/02/2017    RUBEN (acute kidney injury) [facility] ) 05/02/2017    Hyperkalemia 05/02/2017    Elevated troponin 05/02/2017     Past Medical History:   Diagnosis Date    Diabetes mellitus ([facility] )     Disease of thyroid gland     Hyperlipidemia     Hypertension     Pacemaker        Admitting Diagnosis: Dizziness [R42]  Fever [R50 9]  Ambulatory dysfunction [R26 2]    Age/Sex: 80 y o  female    Assessment/Plan:   * Dizziness   Assessment & Plan     · Will treat with IVF hydration, recently with diarrhea earlier this week  · prn meclizine   · Check orthostatic blood pressures   · PT/OT evaluation        Fever   Assessment & Plan     · Tmax 100 7 degF   · Follow up blood cultures   · UA benign   · CXR negative for infiltrate, pt reports chronic cough   · Flu PCR negative for viral illness   · Prn tylenol        Atrial fibrillation   Assessment & Plan     · S/p PPM in may 2017   · Continue eliquis  · Continue metoprolol   · Device check        Erythema of lower extremity   Assessment & Plan     · Of bilateral LE, painless, reportedly chronic   · Low suspicion of cellulitis, hold off on abx for now   · Serial skin exams   · Local skin care       Essential hypertension   Assessment & Plan     · Continue with lisinopril, metoprolol, hold torsemide 5mg        Hypothyroidism   Assessment & Plan     · Check TSH   · Continue synthroid        Type 2 diabetes mellitus (HCC)   Assessment & Plan     · Previously diet controlled   · SSI, accuchecks, check HA1C       Hyperlipidemia   Assessment & Plan     · Continue statin therapy           VTE Prophylaxis: Apixaban (Eliquis)  / sequential compression device   Code Status: FULL CODE  - d/w patient 1/5/18  POLST: POLST form is not discussed and not completed at this time  Discussion with family: n/a     Anticipated Length of Stay:  Patient will be admitted on an Observation basis with an anticipated length of stay of  Less than 2 midnights     Justification for Hospital Stay: workup for dizziness and fever as outlined above    Admission Orders:  Telemetry   Accuchecks QAC and QHS with coverage  Orthostatic BP  VS q4h  MRSA cx  Pt, ot  Sequential compression device  Blood cxs pending    Scheduled Meds:   apixaban 2 5 mg Oral BID   atorvastatin 10 mg Oral Daily   cholecalciferol 2,000 Units Oral Daily   clotrimazole  Topical BID   docusate sodium 100 mg Oral BID   insulin lispro 1-5 Units Subcutaneous TID AC   insulin lispro 1-5 Units Subcutaneous HS   levothyroxine 25 mcg Oral Early Morning   lisinopril 10 mg Oral Daily   metoprolol tartrate 25 mg Oral Daily     Continuous Infusions:   sodium chloride 75 mL/hr Last Rate: 75 mL/hr (01/05/18 1450)     PRN Meds:   acetaminophen    aluminum-magnesium hydroxide-simethicone    meclizine    ondansetron    pneumococcal 13-valent conjugate vaccine    Thank you,  [facility] in the [facility] by [employer] for 2017  Network Utilization Review Department  Phone: [phone]; Fax [phone]  ATTENTION: The Network Utilization Review Department is now centralized for our 7 Facilities  Make a note that we have a new phone and fax numbers for our Department  Please call with any questions or concerns to [phone] and carefully follow the prompts so that you are directed to the right person  All voicemails are confidential  Fax any determinations, approvals, denials, and requests for initial or continue stay review clinical to [phone]  Due to HIGH CALL volume, it would be easier if you could please send faxed requests to expedite your requests and in part, help us provide discharge notifications faster

## 2018-01-07 LAB
GLUCOSE SERPL-MCNC: 109 MG/DL (ref 65–140)
GLUCOSE SERPL-MCNC: 115 MG/DL (ref 65–140)
GLUCOSE SERPL-MCNC: 137 MG/DL (ref 65–140)
GLUCOSE SERPL-MCNC: 149 MG/DL (ref 65–140)
MRSA NOSE QL CULT: NORMAL

## 2018-01-07 PROCEDURE — G8987 SELF CARE CURRENT STATUS: HCPCS

## 2018-01-07 PROCEDURE — G8988 SELF CARE GOAL STATUS: HCPCS

## 2018-01-07 PROCEDURE — 97167 OT EVAL HIGH COMPLEX 60 MIN: CPT

## 2018-01-07 PROCEDURE — 82948 REAGENT STRIP/BLOOD GLUCOSE: CPT

## 2018-01-07 PROCEDURE — 94760 N-INVAS EAR/PLS OXIMETRY 1: CPT

## 2018-01-07 RX ADMIN — ACETAMINOPHEN 488 MG: 325 TABLET, FILM COATED ORAL at 00:39

## 2018-01-07 RX ADMIN — LEVOTHYROXINE SODIUM 25 MCG: 25 TABLET ORAL at 05:24

## 2018-01-07 RX ADMIN — CLOTRIMAZOLE: 10 CREAM TOPICAL at 17:32

## 2018-01-07 RX ADMIN — GUAIFENESIN 600 MG: 600 TABLET, EXTENDED RELEASE ORAL at 21:44

## 2018-01-07 RX ADMIN — GUAIFENESIN 600 MG: 600 TABLET, EXTENDED RELEASE ORAL at 10:13

## 2018-01-07 RX ADMIN — VITAMIN D, TAB 1000IU (100/BT) 2000 UNITS: 25 TAB at 10:13

## 2018-01-07 RX ADMIN — METOPROLOL TARTRATE 25 MG: 25 TABLET ORAL at 10:13

## 2018-01-07 RX ADMIN — SODIUM CHLORIDE 75 ML/HR: 0.9 INJECTION, SOLUTION INTRAVENOUS at 10:27

## 2018-01-07 RX ADMIN — LISINOPRIL 10 MG: 10 TABLET ORAL at 10:13

## 2018-01-07 RX ADMIN — APIXABAN 2.5 MG: 2.5 TABLET, FILM COATED ORAL at 10:13

## 2018-01-07 RX ADMIN — CLOTRIMAZOLE 1 APPLICATION: 10 CREAM TOPICAL at 10:14

## 2018-01-07 RX ADMIN — DOCUSATE SODIUM 100 MG: 100 CAPSULE, LIQUID FILLED ORAL at 10:13

## 2018-01-07 RX ADMIN — ATORVASTATIN CALCIUM 10 MG: 10 TABLET, FILM COATED ORAL at 10:13

## 2018-01-07 RX ADMIN — DOCUSATE SODIUM 100 MG: 100 CAPSULE, LIQUID FILLED ORAL at 17:29

## 2018-01-07 RX ADMIN — APIXABAN 2.5 MG: 2.5 TABLET, FILM COATED ORAL at 17:29

## 2018-01-07 NOTE — ASSESSMENT & PLAN NOTE
· Tmax 1 1 overnight  · Follow up blood cultures   · UA benign   · CXR negative for infiltrate, pt reports chronic cough   · Flu PCR negative for viral illness   · Prn tylenol   · May be contributing to primary symptoms of dizziness as well

## 2018-01-07 NOTE — RESTORATIVE TECHNICIAN NOTE
Restorative Specialist Mobility Note       Activity: Other (Comment) (Educated/encouraged pt to ambulate w/assistance 3-4 x's/day, pt refused  Bed alarm on  Pt callbell, phone/tray within reach )              Repositioned:  Other (Comment) (Rep /sat pt upright in cardiac chair position  )          Leilani Barajas BS, Restorative Technician,

## 2018-01-07 NOTE — PLAN OF CARE
Problem: OCCUPATIONAL THERAPY ADULT  Goal: Performs self-care activities at highest level of function for planned discharge setting  See evaluation for individualized goals  Treatment Interventions: ADL retraining, Functional transfer training, Endurance training, Cognitive reorientation, Patient/family training, Equipment evaluation/education, Compensatory technique education, Continued evaluation, Energy conservation, Activityengagement          See flowsheet documentation for full assessment, interventions and recommendations  Limitation: Decreased ADL status, Decreased Safe judgement during ADL, Decreased endurance, Decreased self-care trans, Decreased high-level ADLs, Decreased cognition  Prognosis: Fair  Assessment: Pt is an 81 y/o female seen for OT eval s/p adm to [facility] w/ dizziness x3 days and fever  Comorbidities include a h/o HTN, HLD, a-fib s/p PPM, urinary incontinence, and DM  Pt with active OT orders and up with assistance orders  Pt presents from [facility] where she resides  Pt reports she has A for ADLS and IALDS, uses a rw sometimes and a w/c sometimes  Pt reports she is normally able to transfer from bed to w/c on her own  Pt reports receiving therapy at [facility] everyday for 1 hr  Pt is currently demonstrating the following occupational deficits: mod A UB ADLS, max A-total A LB ADLS, and max A x2 stand pivot transfers  Pt with deficits and limitations in all baseline areas of occupation 2* fatigue, SOB, decreased initiation, decreased processing, decreased endurance, decreased activity tolerance, deconditioning/generalized weakness, decreased transfer status, decreased mobility status, and decreased ADL stauts  The following Occupational Performance Areas to address include: bathing/shower, toilet hygiene, dressing, medication management, health maintenance, functional mobility, community mobility and clothing management  Pt scored overall 25/100 on the Barthel Index   Based on the aforementioned OT evaluation, functional performance deficits, and assessments, pt has been identified as a high complexity evaluation  Recommend pt return to [facility] w/ increased assistance and increased OT services if able  If unable to return, recommend inpatient rehab to increase functional skills  Pt to continue to benefit from acute immediate OT services to address the following goals 2-3x/wk to  w/in 7-10 days:     OT Discharge Recommendation: Other (Comment) (Please see assessment section )  OT - OK to Discharge:  Yes      Vinita Rodriguez MS, OTR/L

## 2018-01-07 NOTE — ASSESSMENT & PLAN NOTE
· Previously diet controlled   · SSI, accuchecks, check EG5R--7 6  · Blood sugars in the low 100s    · Continue to monitor

## 2018-01-07 NOTE — PROGRESS NOTES
Progress Note - Tamara Bernal 3/6/1931, 80 y o  female MRN: [id number]    Unit/Bed#: [facility] 725-01 Encounter: [id number]    Primary Care Provider: Brittany Andersen MD   Date and time admitted to hospital: 1/5/2018  5:50 AM        * Fever   Assessment & Plan    · Tmax 1 1 overnight  · Follow up blood cultures   · UA benign   · CXR negative for infiltrate, pt reports chronic cough   · Flu PCR negative for viral illness   · Prn tylenol   · May be contributing to primary symptoms of dizziness as well  Dizziness   Assessment & Plan    · Will treat with IVF hydration, recently with diarrhea earlier this week  · prn meclizine   · Check orthostatic blood pressures   · PT/OT evaluation         Hyperlipidemia   Assessment & Plan    · Continue statin therapy         Erythema of lower extremity   Assessment & Plan    · Of bilateral LE, painless, reportedly chronic   · Low suspicion of cellulitis, hold off on abx for now   · Serial skin exams   · Local skin care        Type 2 diabetes mellitus (HCC)   Assessment & Plan    · Previously diet controlled   · SSI, accuchecks, check KR2Z--8 6  · Blood sugars in the low 100s  · Continue to monitor        Atrial fibrillation   Assessment & Plan    · S/p PPM in may 2017   · Continue eliquis  · Continue metoprolol   · Device check         Essential hypertension   Assessment & Plan    · Continue with lisinopril, metoprolol,   · hold torsemide 5mg   · Monitor blood pressure  Currently systolic in the 718U range  Hypothyroidism   Assessment & Plan    · Check TSH   · Continue synthroid           VTE Pharmacologic Prophylaxis:   Pharmacologic: Apixaban (Eliquis)  Mechanical VTE Prophylaxis in Place: Yes    Patient Centered Rounds: I have performed bedside rounds with nursing staff today  Time Spent for Care: 15 minutes  More than 50% of total time spent on counseling and coordination of care as described above      Current Length of Stay: 1 day(s)    Current Patient Status: Inpatient   Certification Statement: The patient will continue to require additional inpatient hospital stay due to Need to monitor symptoms    Discharge Plan:  Not ready for discharge at  Code Status: Level 1 - Full Code      Subjective:   Patient comfortable    Objective:     Vitals:   Temp (24hrs), Av 6 °F (37 6 °C), Min:98 5 °F (36 9 °C), Max:101 8 °F (38 8 °C)    HR:  [60-66] 63  Resp:  [18] 18  BP: (134-173)/(65-82) 150/78  SpO2:  [93 %-96 %] 96 %  Body mass index is 28 59 kg/m²  Input and Output Summary (last 24 hours): Intake/Output Summary (Last 24 hours) at 18 1500  Last data filed at 18 2128   Gross per 24 hour   Intake             1540 ml   Output              199 ml   Net             1341 ml       Physical Exam:     Physical Exam   Constitutional: She is oriented to person, place, and time  She appears well-developed and well-nourished  Eyes: Conjunctivae are normal  Pupils are equal, round, and reactive to light  Pulmonary/Chest: Effort normal and breath sounds normal  No respiratory distress  She has no wheezes  Abdominal: Soft  Bowel sounds are normal  She exhibits no distension  There is no tenderness  Neurological: She is alert and oriented to person, place, and time  Skin: Skin is warm and dry         Additional Data:     Labs:      Results from last 7 days  Lab Units 18  0718 18  0559   WBC Thousand/uL 7 69 7 80   HEMOGLOBIN g/dL 10 5* 11 3*   HEMATOCRIT % 32 5* 33 8*   PLATELETS Thousands/uL 181 187   NEUTROS PCT %  --  72   LYMPHS PCT %  --  13*   MONOS PCT %  --  11   EOS PCT %  --  4       Results from last 7 days  Lab Units 18  0718   SODIUM mmol/L 143   POTASSIUM mmol/L 3 7   CHLORIDE mmol/L 109*   CO2 mmol/L 25   BUN mg/dL 14   CREATININE mg/dL 0 94   CALCIUM mg/dL 8 4   TOTAL PROTEIN g/dL 6 6   BILIRUBIN TOTAL mg/dL 0 83   ALK PHOS U/L 109   ALT U/L 25   AST U/L 22   GLUCOSE RANDOM mg/dL 109           * I Have Reviewed All Lab Data Listed Above  * Additional Pertinent Lab Tests Reviewed: All Labs Within Last 24 Hours Reviewed        Recent Cultures (last 7 days):       Results from last 7 days  Lab Units 01/05/18  0807 01/05/18  0804 01/05/18  0801   BLOOD CULTURE   --  No Growth at 48 hrs  No Growth at 48 hrs  INFLUENZA A PCR  None Detected  --   --    INFLUENZA B PCR  None Detected  --   --    RSV PCR  None Detected  --   --        Last 24 Hours Medication List:     apixaban 2 5 mg Oral BID   atorvastatin 10 mg Oral Daily   cholecalciferol 2,000 Units Oral Daily   clotrimazole  Topical BID   docusate sodium 100 mg Oral BID   guaiFENesin 600 mg Oral Q12H [address]   insulin lispro 1-5 Units Subcutaneous TID AC   insulin lispro 1-5 Units Subcutaneous HS   levothyroxine 25 mcg Oral Early Morning   lisinopril 10 mg Oral Daily   metoprolol tartrate 25 mg Oral Daily        Today, Patient Was Seen By: Janny Rosen DO    ** Please Note: Dictation voice to text software may have been used in the creation of this document   **

## 2018-01-07 NOTE — OCCUPATIONAL THERAPY NOTE
[address] Evaluation     Patient Name: Timothy Tee  OIBCS'G Date: 1/7/2018  Problem List  Patient Active Problem List   Diagnosis    Hyperlipemia    Essential hypertension    Hypothyroidism    Atrial fibrillation    [facility]    Status post placement of cardiac pacemaker    Dizziness    Fever    Erythema of lower extremity    Hyperlipidemia     Past Medical History  Past Medical History:   Diagnosis Date    [facility] ([facility]     Disease of thyroid gland     Hyperlipidemia     Hypertension     Pacemaker      Past Surgical History  Past Surgical History:   Procedure Laterality Date    APPENDECTOMY  1944 01/07/18 1345   Note Type   Note type Eval/Treat   Restrictions/Precautions   Weight Bearing Precautions Per Order No   Other Precautions Contact/isolation;Cognitive; Chair Alarm; Bed Alarm;Multiple lines; Fall Risk  (Chair alarm on at end of therapy session )   Pain Assessment   Pain Assessment No/denies pain   Pain Score No Pain   Home Living   Type of Home SNF  ([facility] )   Home Equipment Nette Cody; Wheelchair-manual  (rollator )   Additional Comments Pt presents from [facility] where she resides  Pt reports able to perform functional transfers to/from w/c on her own and able to self-propell at [facility]  Pt reports using rw for transfers and mobility PTA   Prior Function   Level of Lea Needs assistance with ADLs and functional mobility; Needs assistance with IADLs   Lives With Facility staff   ADL Assistance Needs assistance   IADLs Needs assistance   Comments Pt reports she has A for ADLS and IALDS   Lifestyle   Autonomy Pt reports A for all ADLS/IADLS PTA  Pt reports taking herself to/from the bathroom w/ assist for hygiene once done      Reciprocal Relationships Pt lives at [facility] and receives A from facility staff   Intrinsic Gratification Pt enjoys attending [employer]    Psychosocial   Psychosocial (WDL) WDL   Subjective   Subjective "Well I have been here too long now, I am weak"   ADL   Eating Assistance 5  Supervision/Setup   Grooming Assistance 5  Supervision/Setup   UB Bathing Assistance 4  Minimal Assistance   LB Bathing Assistance 2  Maximal [address] 3  Moderate [address] 1  Total [address]  2  Maximal Assistance   Bed Mobility   Supine to Sit 3  Moderate assistance   Additional items Assist x 1; Increased time required;Verbal cues;LE management   Sit to Supine Unable to assess  (pt oob at end of therapy session )   Transfers   Sit to Stand 2  Maximal assistance   Additional items Assist x 2; Increased time required;Verbal cues   Stand to Sit 2  Maximal assistance   Additional items Assist x 2;Impulsive;Verbal cues   Stand pivot 2  Maximal assistance   Additional items Assist x 2; Increased time required;Verbal cues   Additional Comments Pt performs functional transfers w/ max A x2 for force production, steadying/balance, verbal cues to sequence, assist for body mechanics and cues for safety  Pt noted w/ anterior lean and requires max A x2 and constant verbal cues for body mechanics  Functional Mobility   Additional Comments Not appropriate for rw use at this time  Pt reports mod I use of w/c at [facility]  Balance   Static Sitting Fair   Static Standing Poor   Dynamic Standing Poor -   Activity Tolerance   Activity Tolerance Patient limited by fatigue   Nurse Made Aware ASHLEY Peterson confirm pt appropriate for OT eval and updated post session  ASHLEY Gusman present during session  RUE Assessment   RUE Assessment WFL   LUE Assessment   LUE Assessment WFL   Hand Function   Gross Motor Coordination Functional   Fine Motor Coordination Functional   Cognition   Overall Cognitive Status Impaired   Arousal/Participation Alert; Responsive; Cooperative   Attention Attends with cues to redirect   Orientation Level Oriented to person;Oriented to place; Disoriented to time   Memory Decreased recall of precautions;Decreased recall of recent events;Decreased short term memory   Following Commands Follows one step commands with increased time or repetition   Comments Pt is alert and oriented to person and place  Pt reports the month as "February" and the year as "2018 " Pt able to self correct to identify she is at [place] [address]  Assessment   Limitation Decreased ADL status; Decreased Safe judgement during ADL;Decreased endurance;Decreased self-care trans;Decreased high-level ADLs; Decreased cognition   Prognosis Fair   Assessment Pt is an 81 y/o female seen for OT eval s/p adm to [facility] w/ dizziness x3 days and fever  Comorbidities include a h/o HTN, HLD, a-fib s/p PPM, urinary incontinence, and DM  Pt with active OT orders and up with assistance orders  Pt presents from [facility] where she resides  Pt reports she has A for ADLS and IALDS, uses a rw sometimes and a w/c sometimes  Pt reports she is normally able to transfer from bed to w/c on her own  Pt reports receiving therapy at [facility] everyday for 1 hr  Pt is currently demonstrating the following occupational deficits: mod A UB ADLS, max A-total A LB ADLS, and max A x2 stand pivot transfers  Pt with deficits and limitations in all baseline areas of occupation 2* fatigue, SOB, decreased initiation, decreased processing, decreased endurance, decreased activity tolerance, deconditioning/generalized weakness, decreased transfer status, decreased mobility status, and decreased ADL stauts  The following Occupational Performance Areas to address include: bathing/shower, toilet hygiene, dressing, medication management, health maintenance, functional mobility, community mobility and clothing management  Pt scored overall 25/100 on the Barthel Index  Based on the aforementioned OT evaluation, functional performance deficits, and assessments, pt has been identified as a high complexity evaluation  Recommend pt return to [facility] w/ increased assistance and increased OT services if able   If unable to return, recommend inpatient rehab to increase functional skills  Pt to continue to benefit from acute immediate OT services to address the following goals 2-3x/wk to  w/in 7-10 days:   Goals   Patient Goals to get out of bed   Plan   Treatment Interventions ADL retraining;Functional transfer training; Endurance training;Cognitive reorientation;Patient/family training;Equipment evaluation/education; Compensatory technique education;Continued evaluation; Energy conservation; Activityengagement   Goal Expiration Date 18   OT Frequency 2-3x/wk   Recommendation   OT Discharge Recommendation Other (Comment)  (Please see assessment section )   OT - OK to Discharge Yes   Barthel Index   Feeding 10   Bathing 0   Grooming Score 0   Dressing Score 5   Bladder Score 0   Bowels Score 5   Toilet Use Score 0   Transfers (Bed/Chair) Score 5   Mobility (Level Surface) Score 0   Stairs Score 0   Barthel Index Score 25       GOALS:    1) Pt will improve activity tolerance to G for min 30 min txment sessions  2) Pt will complete UB ADLs/self care w/ min A and LB ADLs/self care w/ mod A w/ G hyiene/thoroughness w/ min cues fro cog support  3) Pt will complete toileting w/ min A w/ G hygiene/thoroughness using DME as needed  4) Pt will improve functional transfers on/off all surfaces using DME as needed w/ G balance/safety including toileting w/ min A   5) Pt will improve functional mobility using w/c during ADL/IADL/leisure tasks using DME as needed w/ g balance/safety w/ S  6) Pt will engage in ongoing cognitive assessment w/ G participation w/ min A to assist w/ safe d/c planning/recommendations  7) Pt will demonstrate G carryover of pt/caregiver education and training as appropriate w/ min A w/o cues w/ G tolerance  8) Pt will demonstrate 100% carryover of learned E C  techniques s/p skilled education w/o cues t/o functional ADL/ IADL/leisure interest tasks w/ min A        Kenneth Werner MS, OTR/L

## 2018-01-07 NOTE — ASSESSMENT & PLAN NOTE
· Continue with lisinopril, metoprolol,   · hold torsemide 5mg   · Monitor blood pressure  Currently systolic in the 986B range

## 2018-01-08 LAB
GLUCOSE SERPL-MCNC: 140 MG/DL (ref 65–140)
GLUCOSE SERPL-MCNC: 149 MG/DL (ref 65–140)
GLUCOSE SERPL-MCNC: 160 MG/DL (ref 65–140)
GLUCOSE SERPL-MCNC: 81 MG/DL (ref 65–140)

## 2018-01-08 PROCEDURE — 97535 SELF CARE MNGMENT TRAINING: CPT

## 2018-01-08 PROCEDURE — 94760 N-INVAS EAR/PLS OXIMETRY 1: CPT

## 2018-01-08 PROCEDURE — 82948 REAGENT STRIP/BLOOD GLUCOSE: CPT

## 2018-01-08 PROCEDURE — 97530 THERAPEUTIC ACTIVITIES: CPT

## 2018-01-08 RX ORDER — HYDRALAZINE HYDROCHLORIDE 20 MG/ML
5 INJECTION INTRAMUSCULAR; INTRAVENOUS EVERY 6 HOURS PRN
Status: DISCONTINUED | OUTPATIENT
Start: 2018-01-08 | End: 2018-01-10 | Stop reason: HOSPADM

## 2018-01-08 RX ADMIN — DOCUSATE SODIUM 100 MG: 100 CAPSULE, LIQUID FILLED ORAL at 09:27

## 2018-01-08 RX ADMIN — LISINOPRIL 10 MG: 10 TABLET ORAL at 09:26

## 2018-01-08 RX ADMIN — APIXABAN 2.5 MG: 2.5 TABLET, FILM COATED ORAL at 09:27

## 2018-01-08 RX ADMIN — VITAMIN D, TAB 1000IU (100/BT) 2000 UNITS: 25 TAB at 09:27

## 2018-01-08 RX ADMIN — GUAIFENESIN 600 MG: 600 TABLET, EXTENDED RELEASE ORAL at 21:12

## 2018-01-08 RX ADMIN — GUAIFENESIN 600 MG: 600 TABLET, EXTENDED RELEASE ORAL at 09:26

## 2018-01-08 RX ADMIN — SODIUM CHLORIDE 75 ML/HR: 0.9 INJECTION, SOLUTION INTRAVENOUS at 00:49

## 2018-01-08 RX ADMIN — LEVOTHYROXINE SODIUM 25 MCG: 25 TABLET ORAL at 05:59

## 2018-01-08 RX ADMIN — DOCUSATE SODIUM 100 MG: 100 CAPSULE, LIQUID FILLED ORAL at 17:05

## 2018-01-08 RX ADMIN — APIXABAN 2.5 MG: 2.5 TABLET, FILM COATED ORAL at 17:05

## 2018-01-08 RX ADMIN — CLOTRIMAZOLE 1 APPLICATION: 10 CREAM TOPICAL at 09:28

## 2018-01-08 RX ADMIN — CLOTRIMAZOLE 1 APPLICATION: 10 CREAM TOPICAL at 17:11

## 2018-01-08 RX ADMIN — SODIUM CHLORIDE 75 ML/HR: 0.9 INJECTION, SOLUTION INTRAVENOUS at 14:57

## 2018-01-08 RX ADMIN — INSULIN LISPRO 1 UNITS: 100 INJECTION, SOLUTION INTRAVENOUS; SUBCUTANEOUS at 21:12

## 2018-01-08 RX ADMIN — ATORVASTATIN CALCIUM 10 MG: 10 TABLET, FILM COATED ORAL at 09:26

## 2018-01-08 RX ADMIN — METOPROLOL TARTRATE 25 MG: 25 TABLET ORAL at 09:26

## 2018-01-08 NOTE — PROGRESS NOTES
Pt ordered hydralazine PRN for SBP >180   Pt SBP was 171/81  No interventions at this time needed  Will continue to monitor

## 2018-01-08 NOTE — PLAN OF CARE
DISCHARGE PLANNING     Discharge to home or other facility with appropriate resources Progressing        DISCHARGE PLANNING - CARE MANAGEMENT     Discharge to post-acute care or home with appropriate resources Progressing        INFECTION - ADULT     Absence or prevention of progression during hospitalization Progressing        Knowledge Deficit     Patient/family/caregiver demonstrates understanding of disease process, treatment plan, medications, and discharge instructions Progressing        METABOLIC, FLUID AND ELECTROLYTES - ADULT     Electrolytes maintained within normal limits Progressing     Fluid balance maintained Progressing        MUSCULOSKELETAL - ADULT     Maintain or return mobility to safest level of function Progressing        PAIN - ADULT     Verbalizes/displays adequate comfort level or baseline comfort level Progressing        Potential for Falls     Patient will remain free of falls Progressing        Prexisting or High Potential for Compromised Skin Integrity     Skin integrity is maintained or improved Progressing        SAFETY ADULT     Maintain or return to baseline ADL function Progressing     Maintain or return mobility status to optimal level Progressing        SKIN/TISSUE INTEGRITY - ADULT     Skin integrity remains intact Progressing

## 2018-01-08 NOTE — SOCIAL WORK
Met with pt to discuss her discharge plan  Informed her that therapy is recommending inpt rehab  She would prefer to stay at [facility] for rehab  SNF rehab list provided for her to review  She would prefer to return to [facility], but if she would need to go to rehab she would prefer a referral to [place] [facility], as she was there before, and [facility] referral sent for same  CM offered to contact family to assist in making inpt rehab choice and she stated that she did not want to bother them at this time

## 2018-01-08 NOTE — ASSESSMENT & PLAN NOTE
· Continue with lisinopril, metoprolol,   · hold torsemide 5mg   · Monitor blood pressure  Currently systolic in the 834R range

## 2018-01-08 NOTE — ASSESSMENT & PLAN NOTE
· Previously diet controlled   · SSI, accuchecks, check NH9K--9 6  · Blood sugars in the low 100s    · Continue to monitor

## 2018-01-08 NOTE — OCCUPATIONAL THERAPY NOTE
Occupational Therapy Treatment Note     01/08/18 1213   Restrictions/Precautions   Weight Bearing Precautions Per Order No   Other Precautions Chair Alarm; Fall Risk;Multiple lines;Cognitive   Lifestyle   Autonomy Pt reports A for all ADLS/IADLS PTA  Pt reports taking herself to/from the bathroom w/ assist for hygiene once done  Reciprocal Relationships Pt lives at [facility] and receives A from facility staff   Intrinsic Gratification Pt enjoys attending [facility]    Pain Assessment   Pain Assessment FLACC   Pain Location Knee   Pain Orientation Bilateral   Pain Rating: FLACC (Rest) - Face 0   Pain Rating: FLACC (Rest) - Legs 0   Pain Rating: FLACC (Rest) - Activity 0   Pain Rating: FLACC (Rest) - Cry 0   Pain Rating: FLACC (Rest) - Consolability 0   Score: FLACC (Rest) 0   Pain Rating: FLACC (Activity) - Face 0   Pain Rating: FLACC (Activity) - Legs 0   Pain Rating: FLACC (Activity) - Activity 0   Pain Rating: FLACC (Activity) - Cry 1   Pain Rating: FLACC (Activity) - Consolability 1   Score: FLACC (Activity) 2   ADL   Where Assessed Sitting at sink   Grooming Assistance 5  Supervision/Setup   Transfers   Sit to Stand 2  Maximal assistance   Additional items Assist x 2   Stand to Sit 2  Maximal assistance   Additional items Assist x 2   Stand pivot 3  Moderate assistance   Additional items Assist x 2   Functional Mobility   Functional Mobility 2  Maximal assistance   Additional Comments x2 with chair follow    Cognition   Overall Cognitive Status Impaired   Arousal/Participation Alert; Responsive; Cooperative   Attention Attends with cues to redirect   Orientation Level Oriented X4   Memory Decreased recall of precautions;Decreased recall of recent events;Decreased short term memory   Following Commands Follows one step commands with increased time or repetition   Activity Tolerance   Activity Tolerance Patient tolerated treatment well   Assessment   Assessment Pt participated in occupational therapy with focus on activity tolerance, functional transfers/mob, UB self-care  Pt cleared by RN/Ramona for participation in OT session  Pt presented sitting out of bed to bedside chair upon initiation of OT session  Pt pleasant cooperative and reported use of wheelchair for grooming tasks at bathroom sink   Pt was very tangential and required min verbal cues to re-direct to tasks  pt recommended to to return to [facility] with increased assistance and OT serivces if available  If pt unable to return then recommend pt for in-pt rehab shelia increase pt functional skills      Plan   Treatment Interventions ADL retraining   Goal Expiration Date 01/18/18   Treatment Day 1   OT Frequency 2-3x/wk   Recommendation   OT Discharge Recommendation Other (Comment)  (return to [facility] vs in-pt rehab )   Barthel Index   Feeding 10   Bathing 0   Grooming Score 0   Dressing Score 5   Bladder Score 0   Bowels Score 5   Toilet Use Score 0   Transfers (Bed/Chair) Score 5   Mobility (Level Surface) Score 0   Stairs Score 0   Barthel Index Score 25   Modified [place] Scale   Modified [place] Scale 4       Waldorfmary Garcia  POWELL/L

## 2018-01-08 NOTE — PROGRESS NOTES
Progress Note - Chip Trinh 3/6/1931, 80 y o  female MRN: [id number]    Unit/Bed#: [facility] 725-01 Encounter: [id number]    Primary Care Provider: Laura Young MD   Date and time admitted to hospital: 1/5/2018  5:50 AM        * Fever   Assessment & Plan    ·  No fevers overnight  · Follow up blood cultures   ·   Possible discharge within the next 24 to 48 hours  · UA benign   · CXR negative for infiltrate, pt reports chronic cough   · Flu PCR negative for viral illness   · Prn tylenol   · May be contributing to primary symptoms of dizziness as well  Dizziness   Assessment & Plan    · Will treat with IVF hydration, recently with diarrhea earlier this week  · prn meclizine   · Check orthostatic blood pressures   · PT/OT evaluation         Hyperlipidemia   Assessment & Plan    · Continue statin therapy         Erythema of lower extremity   Assessment & Plan    · Of bilateral LE, painless, reportedly chronic   · Low suspicion of cellulitis, hold off on abx for now   · Serial skin exams   · Local skin care        Type 2 diabetes mellitus (HCC)   Assessment & Plan    · Previously diet controlled   · SSI, accuchecks, check TU6Y--5 6  · Blood sugars in the low 100s  · Continue to monitor        Atrial fibrillation   Assessment & Plan    · S/p PPM in may 2017   · Continue eliquis  · Continue metoprolol   · Device check         Essential hypertension   Assessment & Plan    · Continue with lisinopril, metoprolol,   · hold torsemide 5mg   · Monitor blood pressure  Currently systolic in the 449O range  Hypothyroidism   Assessment & Plan    · Check TSH   · Continue synthroid             VTE Pharmacologic Prophylaxis:   Pharmacologic: Apixaban (Eliquis)  Mechanical VTE Prophylaxis in Place: Yes    Patient Centered Rounds: I have performed bedside rounds with nursing staff today  Time Spent for Care: 15 minutes    More than 50% of total time spent on counseling and coordination of care as described above     Current Length of Stay: 2 day(s)    Current Patient Status: Inpatient   Certification Statement: The patient will continue to require additional inpatient hospital stay due to Need to monitor symptoms      Code Status: Level 1 - Full Code      Subjective:   Patient more comfortable today afebrile  Blood culture still pending  Objective:     Vitals:   Temp (24hrs), Av 4 °F (36 9 °C), Min:98 1 °F (36 7 °C), Max:98 5 °F (36 9 °C)    HR:  [59-60] 59  Resp:  [18] 18  BP: (143-186)/(77-99) 186/99  SpO2:  [94 %-97 %] 94 %  Body mass index is 28 59 kg/m²  Input and Output Summary (last 24 hours): Intake/Output Summary (Last 24 hours) at 18 1104  Last data filed at 18 0900   Gross per 24 hour   Intake             1483 ml   Output              200 ml   Net             1283 ml       Physical Exam:     Physical Exam   Constitutional: She is oriented to person, place, and time  HENT:   Head: Normocephalic and atraumatic  Eyes: Conjunctivae are normal  Pupils are equal, round, and reactive to light  Neck: Normal range of motion  Cardiovascular: Normal rate and regular rhythm  No murmur heard  Pulmonary/Chest: Effort normal and breath sounds normal    Abdominal: There is no tenderness  There is no guarding  Musculoskeletal: Normal range of motion  Neurological: She is alert and oriented to person, place, and time  Skin: Skin is warm and dry         Additional Data:     Labs:      Results from last 7 days  Lab Units 18  0718 18  0559   WBC Thousand/uL 7 69 7 80   HEMOGLOBIN g/dL 10 5* 11 3*   HEMATOCRIT % 32 5* 33 8*   PLATELETS Thousands/uL 181 187   NEUTROS PCT %  --  72   LYMPHS PCT %  --  13*   MONOS PCT %  --  11   EOS PCT %  --  4       Results from last 7 days  Lab Units 18  0718   SODIUM mmol/L 143   POTASSIUM mmol/L 3 7   CHLORIDE mmol/L 109*   CO2 mmol/L 25   BUN mg/dL 14   CREATININE mg/dL 0 94   CALCIUM mg/dL 8 4   TOTAL PROTEIN g/dL 6 6 BILIRUBIN TOTAL mg/dL 0 83   ALK PHOS U/L 109   ALT U/L 25   AST U/L 22   GLUCOSE RANDOM mg/dL 109           * I Have Reviewed All Lab Data Listed Above  * Additional Pertinent Lab Tests Reviewed: All Labs Within Last 24 Hours Reviewed      Recent Cultures (last 7 days):       Results from last 7 days  Lab Units 01/05/18  0807 01/05/18  0804 01/05/18  0801   BLOOD CULTURE   --  No Growth at 72 hrs  No Growth at 72 hrs  INFLUENZA A PCR  None Detected  --   --    INFLUENZA B PCR  None Detected  --   --    RSV PCR  None Detected  --   --        Last 24 Hours Medication List:     apixaban 2 5 mg Oral BID   atorvastatin 10 mg Oral Daily   cholecalciferol 2,000 Units Oral Daily   clotrimazole  Topical BID   docusate sodium 100 mg Oral BID   guaiFENesin 600 mg Oral Q12H [address]   insulin lispro 1-5 Units Subcutaneous TID AC   insulin lispro 1-5 Units Subcutaneous HS   levothyroxine 25 mcg Oral Early Morning   lisinopril 10 mg Oral Daily   metoprolol tartrate 25 mg Oral Daily        Today, Patient Was Seen By: Aarti Mackenzie DO    ** Please Note: Dictation voice to text software may have been used in the creation of this document   **

## 2018-01-08 NOTE — PROGRESS NOTES
Patient BP elevated at 164/81, patient offering no complaints at this time  Notified Eda Howe with [facility], was advised to cont to monitor, no intervention required at this time

## 2018-01-08 NOTE — PLAN OF CARE
Problem: OCCUPATIONAL THERAPY ADULT  Goal: Performs self-care activities at highest level of function for planned discharge setting  See evaluation for individualized goals  Treatment Interventions: ADL retraining, Functional transfer training, Endurance training, Cognitive reorientation, Patient/family training, Equipment evaluation/education, Compensatory technique education, Continued evaluation, Energy conservation, Activityengagement          See flowsheet documentation for full assessment, interventions and recommendations  Outcome: Progressing  Limitation: Decreased ADL status, Decreased Safe judgement during ADL, Decreased endurance, Decreased self-care trans, Decreased high-level ADLs, Decreased cognition  Prognosis: Fair  Assessment: Pt participated in occupational therapy with focus on activity tolerance, functional transfers/mob, UB self-care  Pt cleared by RN/Ramona for participation in OT session  Pt presented sitting out of bed to bedside chair upon initiation of OT session  Pt pleasant cooperative and reported use of wheelchair for grooming tasks at bathroom sink   Pt was very tangential and required min verbal cues to re-direct to tasks  pt recommended to to return to [facility] with increased assistance and OT serivces if available  If pt unable to return then recommend pt for in-pt rehab shelia increase pt functional skills  OT Discharge Recommendation: Other (Comment) (return to [facility] vs in-pt rehab )  OT - OK to Discharge:  Yes

## 2018-01-08 NOTE — RESTORATIVE TECHNICIAN NOTE
Restorative Specialist Mobility Note       Activity: Ambulate in room, Chair, Dangle, Stand at bedside (Educated/encouraged pt to ambulate w/assistance 3-4 x's/day  Chair alarm on   Pt callbell, phone/tray within reach )     Assistive Device: Front wheel walker, Other (Comment) (Assist x2)             Leilani Barajas BS, Restorative Technician,

## 2018-01-08 NOTE — ASSESSMENT & PLAN NOTE
·  No fevers overnight  · Follow up blood cultures   ·   Possible discharge within the next 24 to 48 hours  · UA benign   · CXR negative for infiltrate, pt reports chronic cough   · Flu PCR negative for viral illness   · Prn tylenol   · May be contributing to primary symptoms of dizziness as well

## 2018-01-09 PROBLEM — L53.9 ERYTHEMA OF LOWER EXTREMITY: Status: RESOLVED | Noted: 2018-01-05 | Resolved: 2018-01-09

## 2018-01-09 LAB
GLUCOSE SERPL-MCNC: 117 MG/DL (ref 65–140)
GLUCOSE SERPL-MCNC: 125 MG/DL (ref 65–140)
GLUCOSE SERPL-MCNC: 135 MG/DL (ref 65–140)
GLUCOSE SERPL-MCNC: 159 MG/DL (ref 65–140)

## 2018-01-09 PROCEDURE — G8978 MOBILITY CURRENT STATUS: HCPCS

## 2018-01-09 PROCEDURE — G8979 MOBILITY GOAL STATUS: HCPCS

## 2018-01-09 PROCEDURE — 82948 REAGENT STRIP/BLOOD GLUCOSE: CPT

## 2018-01-09 PROCEDURE — 97163 PT EVAL HIGH COMPLEX 45 MIN: CPT

## 2018-01-09 RX ADMIN — APIXABAN 2.5 MG: 2.5 TABLET, FILM COATED ORAL at 08:51

## 2018-01-09 RX ADMIN — DOCUSATE SODIUM 100 MG: 100 CAPSULE, LIQUID FILLED ORAL at 17:26

## 2018-01-09 RX ADMIN — LISINOPRIL 10 MG: 10 TABLET ORAL at 08:50

## 2018-01-09 RX ADMIN — CLOTRIMAZOLE: 10 CREAM TOPICAL at 08:51

## 2018-01-09 RX ADMIN — DOCUSATE SODIUM 100 MG: 100 CAPSULE, LIQUID FILLED ORAL at 08:50

## 2018-01-09 RX ADMIN — CLOTRIMAZOLE: 10 CREAM TOPICAL at 17:27

## 2018-01-09 RX ADMIN — GUAIFENESIN 600 MG: 600 TABLET, EXTENDED RELEASE ORAL at 22:10

## 2018-01-09 RX ADMIN — METOPROLOL TARTRATE 25 MG: 25 TABLET ORAL at 08:50

## 2018-01-09 RX ADMIN — APIXABAN 2.5 MG: 2.5 TABLET, FILM COATED ORAL at 17:26

## 2018-01-09 RX ADMIN — VITAMIN D, TAB 1000IU (100/BT) 2000 UNITS: 25 TAB at 08:50

## 2018-01-09 RX ADMIN — LEVOTHYROXINE SODIUM 25 MCG: 25 TABLET ORAL at 05:13

## 2018-01-09 RX ADMIN — ATORVASTATIN CALCIUM 10 MG: 10 TABLET, FILM COATED ORAL at 08:50

## 2018-01-09 RX ADMIN — GUAIFENESIN 600 MG: 600 TABLET, EXTENDED RELEASE ORAL at 08:50

## 2018-01-09 RX ADMIN — INSULIN LISPRO 1 UNITS: 100 INJECTION, SOLUTION INTRAVENOUS; SUBCUTANEOUS at 22:10

## 2018-01-09 NOTE — PLAN OF CARE
Problem: PHYSICAL THERAPY ADULT  Goal: Performs mobility at highest level of function for planned discharge setting  See evaluation for individualized goals  Treatment/Interventions: Functional transfer training, LE strengthening/ROM, Therapeutic exercise, Endurance training, Patient/family training, Equipment eval/education, Bed mobility, Gait training  Equipment Recommended: Vicky Bearded, Wheelchair       See flowsheet documentation for full assessment, interventions and recommendations  Prognosis: Fair  Problem List: Decreased strength, Decreased endurance, Impaired balance, Decreased mobility, Decreased cognition, Impaired judgement, Decreased safety awareness, Pain  Assessment: Pt seen for high complexity physical therapy evaluation  Pt is an 81 y/o female w/ history/comorbidities of DM II, hypothyroidism, HTN, pacemaker who is now admitted w/ c/o dizziness as well as fever x 3 days  Dx are as above, a-fib  Undergoing w/u  Due to unclear medical dx, pain, fall risk w/ cog decline, note unstabel clinical picture  PT consulted to assess mobility, d/c needs  Pt presents w/ decreased functional mob, standing balance, endurance, B LE strength   will benefit from skilled PT to correct for the above problems  Recommend rehab at d/c        Recommendation:  (recommend rehab at d/c)     PT - OK to Discharge: (S) Yes (to rehab when stable)    See flowsheet documentation for full assessment

## 2018-01-09 NOTE — ASSESSMENT & PLAN NOTE
- likely secondary to viral illness, RSV negative flu negative  - chest x-ray negative, urinalysis without evidence of infection  - patient improved without antibiotics  - fever is resolved  - if patient remains stable overnight she will be able to be discharged at rehabilitation tomorrow morning when bed is available

## 2018-01-09 NOTE — PROGRESS NOTES
Progress Note - Tomie Buerger 3/6/1931, 80 y o  female MRN: [id number]    Unit/Bed#: [facility] 725-01 Encounter: [id number]    Primary Care Provider: Ana Long MD   Date and time admitted to hospital: 1/5/2018  5:50 AM        * Fever   Assessment & Plan    - likely secondary to viral illness, RSV negative flu negative  - chest x-ray negative, urinalysis without evidence of infection  - patient improved without antibiotics  - fever is resolved  - if patient remains stable overnight she will be able to be discharged at rehabilitation tomorrow morning when bed is available          Dizziness   Assessment & Plan    - likely secondary to fever that might of hypovolemia, this appears resolved        Type 2 diabetes mellitus ([facility] [address] )   Assessment & Plan    - continue with insulin sliding scale acute chest, glycemia is acceptable        Atrial fibrillation   Assessment & Plan    - status post ppm, she will follow up as outpatient with Cardiology  - continue with beta-blockers and Eliquis        Hypothyroidism   Assessment & Plan    - continue with Synthroid        Essential hypertension   Assessment & Plan    - continue with lisinopril and metoprolol, some elevation which the for resolved have been noted, will add new medication in the morning if blood pressure consistently elevated in the next 24 hours          VTE Pharmacologic Prophylaxis: yes  Pharmacologic: Apixaban (Eliquis)  Mechanical VTE Prophylaxis in Place: Yes    Patient Centered Rounds: I have performed bedside rounds with nursing staff today  Discussions with Specialists or Other Care Team Provider: none    Education and Discussions with Family / Patient: patient and daughter in law    Time Spent for Care: 30 minutes  More than 50% of total time spent on counseling and coordination of care as described above      Current Length of Stay: 3 day(s)    Current Patient Status: Inpatient   Certification Statement: The patient will continue to require additional inpatient hospital stay due to see above    Discharge Plan: likely in am to rehab    Code Status: Level 1 - Full Code      Subjective:   Patient states that she feels very well, denies complaints    Objective:     Vitals:   Temp (24hrs), Av 4 °F (36 9 °C), Min:98 1 °F (36 7 °C), Max:98 7 °F (37 1 °C)    HR:  [60-64] 62  Resp:  [18] 18  BP: (157-197)/(76-88) 197/85  SpO2:  [94 %-99 %] 99 %  Body mass index is 28 59 kg/m²  Input and Output Summary (last 24 hours): Intake/Output Summary (Last 24 hours) at 18 1750  Last data filed at 18 1100   Gross per 24 hour   Intake             1370 ml   Output                0 ml   Net             1370 ml       Physical Exam:     Physical Exam   Constitutional: She is oriented to person, place, and time  She appears well-developed  Cardiovascular: Normal rate, regular rhythm and normal heart sounds  Exam reveals no friction rub  No murmur heard  Pulmonary/Chest: Effort normal and breath sounds normal  No respiratory distress  She has no wheezes  She has no rales  Abdominal: Soft  She exhibits no distension  There is no tenderness  There is no rebound  Musculoskeletal: She exhibits edema (Trace bilateral lower extremities)  Neurological: She is alert and oriented to person, place, and time  She exhibits normal muscle tone  Skin: Skin is warm  Psychiatric: She has a normal mood and affect         Additional Data:     Labs:      Results from last 7 days  Lab Units 18  0718 18  0559   WBC Thousand/uL 7 69 7 80   HEMOGLOBIN g/dL 10 5* 11 3*   HEMATOCRIT % 32 5* 33 8*   PLATELETS Thousands/uL 181 187   NEUTROS PCT %  --  72   LYMPHS PCT %  --  13*   MONOS PCT %  --  11   EOS PCT %  --  4       Results from last 7 days  Lab Units 18  0718   SODIUM mmol/L 143   POTASSIUM mmol/L 3 7   CHLORIDE mmol/L 109*   CO2 mmol/L 25   BUN mg/dL 14   CREATININE mg/dL 0 94   CALCIUM mg/dL 8 4   TOTAL PROTEIN g/dL 6 6   BILIRUBIN TOTAL mg/dL 0 83 ALK PHOS U/L 109   ALT U/L 25   AST U/L 22   GLUCOSE RANDOM mg/dL 109           * I Have Reviewed All Lab Data Listed Above  * Additional Pertinent Lab Tests Reviewed: All Labs Within Last 24 Hours Reviewed    Imaging:    Imaging Reports Reviewed Today Include:  None  Imaging Personally Reviewed by Myself Includes:  None    Recent Cultures (last 7 days):       Results from last 7 days  Lab Units 01/05/18  0807 01/05/18  0804 01/05/18  0801   BLOOD CULTURE   --  No Growth After 4 Days  No Growth After 4 Days  INFLUENZA A PCR  None Detected  --   --    INFLUENZA B PCR  None Detected  --   --    RSV PCR  None Detected  --   --        Last 24 Hours Medication List:     apixaban 2 5 mg Oral BID   atorvastatin 10 mg Oral Daily   cholecalciferol 2,000 Units Oral Daily   clotrimazole  Topical BID   docusate sodium 100 mg Oral BID   guaiFENesin 600 mg Oral Q12H DEVI   insulin lispro 1-5 Units Subcutaneous TID AC   insulin lispro 1-5 Units Subcutaneous HS   levothyroxine 25 mcg Oral Early Morning   lisinopril 10 mg Oral Daily   metoprolol tartrate 25 mg Oral Daily        Today, Patient Was Seen By: Della Felton MD    ** Please Note: Dragon 360 Dictation voice to text software may have been used in the creation of this document   **

## 2018-01-09 NOTE — SOCIAL WORK
Received phone call from Saintclair Old, [place] liaison, who stated that In reviewing patient's case, patient is not approved for inpatient acute rehab  Per verbal report from PT, and OT note from 1/8, both are recommending patient return back to [facility] for slower paced therapies, as patient would most likely not be able to tolerate intensive rehab program  MD is recommending the same  Cm informed Saintclair Old that pt is from [facility] assisted living

## 2018-01-09 NOTE — SOCIAL WORK
Cm contacted pt's dgt in law Valentino Cowan, contact # [phone] (number listed as pt's son Preston's mobile number), to discuss pt's discharge plan  Informed her of pt's rehab choices  She stated that the family would be agreeable for pt to go to [facility] if bed available  She stated that the family is discussing other inpt rehab options

## 2018-01-09 NOTE — PHYSICAL THERAPY NOTE
Physical Therapy Evaluation   01/09/18 0950   Note Type   Note type Eval only   Pain Assessment   Pain Assessment 0-10   Pain Score 3   Pain Type Acute pain;Chronic pain   Pain Location Back;Knee   Patient's Stated Pain Goal No pain   Hospital Pain Intervention(s) Ambulation/increased activity;Repositioned   Response to Interventions unchanged   Home Living   Type of Home Assisted living   Additional Comments Resides at [facility]  Per pt, had assist w/ ADLs, and ambulatory to and from dining area w/ RW- unclear if accurate as pt confused   Prior Function   Level of [place] Needs assistance with ADLs and functional mobility   Falls in the last 6 months 1 to 4   Restrictions/Precautions   Weight Bearing Precautions Per Order No   Other Precautions Cognitive; Chair Alarm; Bed Alarm;Pain; Fall Risk;Multiple lines   General   Family/Caregiver Present No   Cognition   Overall Cognitive Status Impaired   Arousal/Participation Responsive   Attention Attends with cues to redirect   Orientation Level Oriented to person   Memory Unable to assess   Following Commands Follows one step commands inconsistently   Comments needs coaxing to participate   RLE Assessment   RLE Assessment (strength 3 to 3+/5)   LLE Assessment   LLE Assessment (strength 3 to 3+/5)   Bed Mobility   Supine to Sit 3  Moderate assistance   Additional items Assist x 1   Transfers   Sit to Stand 2  Maximal assistance   Additional items Assist x 2   Stand to Sit 3  Moderate assistance   Additional items Assist x 2   Ambulation/Elevation   Gait pattern (antalgic, short step length)   Gait Assistance 3  Moderate assist   Additional items Assist x 2   Assistive Device Rolling walker   Distance 1-2' from bed to chair   Balance   Static Sitting Fair   Dynamic Sitting Poor +   Static Standing Poor   Dynamic Standing Poor   Ambulatory Poor   Endurance Deficit   Endurance Deficit Yes   Endurance Deficit Description fatigue, weakness, pain, cog status   Activity Tolerance   Activity Tolerance Patient limited by fatigue;Patient limited by pain;Treatment limited secondary to medical complications (Comment)   Nurse Made Aware yes   Assessment   Prognosis Fair   Problem List Decreased strength;Decreased endurance; Impaired balance;Decreased mobility; Decreased cognition; Impaired judgement;Decreased safety awareness;Pain   Assessment Pt seen for high complexity physical therapy evaluation  Pt is an 79 y/o female w/ history/comorbidities of DM II, hypothyroidism, HTN, pacemaker who is now admitted w/ c/o dizziness as well as fever x 3 days  Dx are as above, a-fib  Undergoing w/u  Due to unclear medical dx, pain, fall risk w/ cog decline, note unstabel clinical picture  PT consulted to assess mobility, d/c needs  Pt presents w/ decreased functional mob, standing balance, endurance, B LE strength   will benefit from skilled PT to correct for the above problems  Recommend rehab at d/c   Goals   Patient Goals none stated   STG Expiration Date 01/23/18   Short Term Goal #1 1-2 wks: bed mob and transfers w/ S, sitting balance to good/normal standing balance to fair w/ device, ambulate  ft w  RW and min A, increase B LE strength by 1/2 -1 grade   Treatment Day 0   Plan   Treatment/Interventions Functional transfer training;LE strengthening/ROM; Therapeutic exercise; Endurance training;Patient/family training;Equipment eval/education; Bed mobility;Gait training   PT Frequency 2-3x/wk;5x/wk  (3-5x/wk)   Recommendation   Recommendation (recommend rehab at d/c)   Equipment Recommended [place]; Wheelchair   PT - OK to Discharge Yes  (to rehab when stable)   Modified [place] Scale   Modified [place] Scale 4   Barthel Index   Feeding 10   Bathing 0   Grooming Score 0   Dressing Score 0   Bladder Score 5   Bowels Score 5   Toilet Use Score 5   Transfers (Bed/Chair) Score 5   Mobility (Level Surface) Score 0   Stairs Score 0   Barthel Index Score 30   Isabel Cueto PT, DPT CSRS

## 2018-01-09 NOTE — ASSESSMENT & PLAN NOTE
- continue with lisinopril and metoprolol, some elevation which the for resolved have been noted, will add new medication in the morning if blood pressure consistently elevated in the next 24 hours

## 2018-01-09 NOTE — ASSESSMENT & PLAN NOTE
- status post ppm, she will follow up as outpatient with Cardiology  - continue with beta-blockers and Eliquis

## 2018-01-09 NOTE — RESTORATIVE TECHNICIAN NOTE
Restorative Specialist Mobility Note       Activity: Ambulate in room, Chair, Dangle, Stand at bedside (Educated/encouraged pt to ambulate w/assistance 3-4 x's/day  Chair alarm on   Pt callbell, phone/tray within reach )     Assistive Device: Front wheel walker (Assisted PT Camille Rader )             Parker RIOJAS, Restorative Technician, [facility]

## 2018-01-10 VITALS
DIASTOLIC BLOOD PRESSURE: 82 MMHG | TEMPERATURE: 99 F | OXYGEN SATURATION: 96 % | BODY MASS INDEX: 28.76 KG/M2 | HEIGHT: 62 IN | SYSTOLIC BLOOD PRESSURE: 152 MMHG | WEIGHT: 156.31 LBS | HEART RATE: 60 BPM | RESPIRATION RATE: 16 BRPM

## 2018-01-10 PROBLEM — R42 DIZZINESS: Status: RESOLVED | Noted: 2018-01-05 | Resolved: 2018-01-10

## 2018-01-10 LAB
ANION GAP SERPL CALCULATED.3IONS-SCNC: 8 MMOL/L (ref 4–13)
BACTERIA BLD CULT: NORMAL
BACTERIA BLD CULT: NORMAL
BASOPHILS # BLD AUTO: 0.03 THOUSANDS/ΜL (ref 0–0.1)
BASOPHILS NFR BLD AUTO: 0 % (ref 0–1)
BUN SERPL-MCNC: 10 MG/DL (ref 5–25)
CALCIUM SERPL-MCNC: 8.6 MG/DL (ref 8.3–10.1)
CHLORIDE SERPL-SCNC: 109 MMOL/L (ref 100–108)
CO2 SERPL-SCNC: 23 MMOL/L (ref 21–32)
CREAT SERPL-MCNC: 0.79 MG/DL (ref 0.6–1.3)
EOSINOPHIL # BLD AUTO: 0.29 THOUSAND/ΜL (ref 0–0.61)
EOSINOPHIL NFR BLD AUTO: 4 % (ref 0–6)
ERYTHROCYTE [DISTWIDTH] IN BLOOD BY AUTOMATED COUNT: 13.9 % (ref 11.6–15.1)
GFR SERPL CREATININE-BSD FRML MDRD: 68 ML/MIN/1.73SQ M
GLUCOSE SERPL-MCNC: 136 MG/DL (ref 65–140)
GLUCOSE SERPL-MCNC: 142 MG/DL (ref 65–140)
GLUCOSE SERPL-MCNC: 90 MG/DL (ref 65–140)
GLUCOSE SERPL-MCNC: 97 MG/DL (ref 65–140)
HCT VFR BLD AUTO: 31 % (ref 34.8–46.1)
HGB BLD-MCNC: 10.3 G/DL (ref 11.5–15.4)
LYMPHOCYTES # BLD AUTO: 1.22 THOUSANDS/ΜL (ref 0.6–4.47)
LYMPHOCYTES NFR BLD AUTO: 16 % (ref 14–44)
MAGNESIUM SERPL-MCNC: 2.1 MG/DL (ref 1.6–2.6)
MCH RBC QN AUTO: 31.1 PG (ref 26.8–34.3)
MCHC RBC AUTO-ENTMCNC: 33.2 G/DL (ref 31.4–37.4)
MCV RBC AUTO: 94 FL (ref 82–98)
MONOCYTES # BLD AUTO: 0.93 THOUSAND/ΜL (ref 0.17–1.22)
MONOCYTES NFR BLD AUTO: 12 % (ref 4–12)
NEUTROPHILS # BLD AUTO: 5.28 THOUSANDS/ΜL (ref 1.85–7.62)
NEUTS SEG NFR BLD AUTO: 68 % (ref 43–75)
NRBC BLD AUTO-RTO: 0 /100 WBCS
PHOSPHATE SERPL-MCNC: 2.5 MG/DL (ref 2.3–4.1)
PLATELET # BLD AUTO: 239 THOUSANDS/UL (ref 149–390)
PMV BLD AUTO: 10.6 FL (ref 8.9–12.7)
POTASSIUM SERPL-SCNC: 3.4 MMOL/L (ref 3.5–5.3)
RBC # BLD AUTO: 3.31 MILLION/UL (ref 3.81–5.12)
SODIUM SERPL-SCNC: 140 MMOL/L (ref 136–145)
WBC # BLD AUTO: 7.78 THOUSAND/UL (ref 4.31–10.16)

## 2018-01-10 PROCEDURE — 82948 REAGENT STRIP/BLOOD GLUCOSE: CPT

## 2018-01-10 PROCEDURE — 80048 BASIC METABOLIC PNL TOTAL CA: CPT | Performed by: INTERNAL MEDICINE

## 2018-01-10 PROCEDURE — 85025 COMPLETE CBC W/AUTO DIFF WBC: CPT | Performed by: INTERNAL MEDICINE

## 2018-01-10 PROCEDURE — 83735 ASSAY OF MAGNESIUM: CPT | Performed by: INTERNAL MEDICINE

## 2018-01-10 PROCEDURE — 84100 ASSAY OF PHOSPHORUS: CPT | Performed by: INTERNAL MEDICINE

## 2018-01-10 RX ORDER — DOCUSATE SODIUM 100 MG/1
100 CAPSULE, LIQUID FILLED ORAL 2 TIMES DAILY
Qty: 10 CAPSULE | Refills: 0 | Status: SHIPPED | OUTPATIENT
Start: 2018-01-10 | End: 2018-07-09 | Stop reason: SDUPTHER

## 2018-01-10 RX ORDER — GUAIFENESIN 600 MG
600 TABLET, EXTENDED RELEASE 12 HR ORAL EVERY 12 HOURS SCHEDULED
Qty: 30 TABLET | Refills: 0 | Status: SHIPPED | OUTPATIENT
Start: 2018-01-10 | End: 2018-08-02 | Stop reason: ALTCHOICE

## 2018-01-10 RX ORDER — MECLIZINE HYDROCHLORIDE 25 MG/1
25 TABLET ORAL EVERY 8 HOURS PRN
Qty: 30 TABLET | Refills: 0 | Status: SHIPPED | OUTPATIENT
Start: 2018-01-10

## 2018-01-10 RX ORDER — POTASSIUM CHLORIDE 20 MEQ/1
40 TABLET, EXTENDED RELEASE ORAL ONCE
Status: COMPLETED | OUTPATIENT
Start: 2018-01-10 | End: 2018-01-10

## 2018-01-10 RX ADMIN — ACETAMINOPHEN 488 MG: 325 TABLET, FILM COATED ORAL at 08:40

## 2018-01-10 RX ADMIN — LEVOTHYROXINE SODIUM 25 MCG: 25 TABLET ORAL at 05:36

## 2018-01-10 RX ADMIN — LISINOPRIL 10 MG: 10 TABLET ORAL at 08:39

## 2018-01-10 RX ADMIN — GUAIFENESIN 600 MG: 600 TABLET, EXTENDED RELEASE ORAL at 08:40

## 2018-01-10 RX ADMIN — POTASSIUM CHLORIDE 40 MEQ: 1500 TABLET, EXTENDED RELEASE ORAL at 10:07

## 2018-01-10 RX ADMIN — APIXABAN 2.5 MG: 2.5 TABLET, FILM COATED ORAL at 16:52

## 2018-01-10 RX ADMIN — METOPROLOL TARTRATE 25 MG: 25 TABLET ORAL at 08:39

## 2018-01-10 RX ADMIN — APIXABAN 2.5 MG: 2.5 TABLET, FILM COATED ORAL at 08:39

## 2018-01-10 RX ADMIN — ATORVASTATIN CALCIUM 10 MG: 10 TABLET, FILM COATED ORAL at 08:39

## 2018-01-10 RX ADMIN — VITAMIN D, TAB 1000IU (100/BT) 2000 UNITS: 25 TAB at 08:39

## 2018-01-10 RX ADMIN — DOCUSATE SODIUM 100 MG: 100 CAPSULE, LIQUID FILLED ORAL at 08:40

## 2018-01-10 NOTE — PLAN OF CARE
DISCHARGE PLANNING     Discharge to home or other facility with appropriate resources Adequate for Discharge        DISCHARGE PLANNING - CARE MANAGEMENT     Discharge to post-acute care or home with appropriate resources Adequate for Discharge        INFECTION - ADULT     Absence or prevention of progression during hospitalization Adequate for Discharge        Knowledge Deficit     Patient/family/caregiver demonstrates understanding of disease process, treatment plan, medications, and discharge instructions Adequate for Discharge        METABOLIC, FLUID AND ELECTROLYTES - ADULT     Electrolytes maintained within normal limits Adequate for Discharge     Fluid balance maintained Adequate for Discharge        MUSCULOSKELETAL - ADULT     Maintain or return mobility to safest level of function Adequate for Discharge        PAIN - ADULT     Verbalizes/displays adequate comfort level or baseline comfort level Adequate for Discharge        Potential for Falls     Patient will remain free of falls Adequate for Discharge        Prexisting or High Potential for Compromised Skin Integrity     Skin integrity is maintained or improved Adequate for Discharge        SAFETY ADULT     Maintain or return to baseline ADL function Adequate for Discharge     Maintain or return mobility status to optimal level Adequate for Discharge        SKIN/TISSUE INTEGRITY - ADULT     Skin integrity remains intact Adequate for Discharge

## 2018-01-10 NOTE — DISCHARGE INSTRUCTIONS
Please note that patient Demadex 10 mg oral daily has been discontinued time being as she was admitted with mild dehydration and dizziness for that  You can re-evaluate the need to restarted Demadex as indicated at your facility  Fever in Adults   WHAT YOU NEED TO KNOW:   What is a fever? A fever is an increase in your body temperature  Normal body temperature is 98 6°F (37°C)  Fever is generally defined as greater than 100 4°F (38°C)  What are common causes of a fever? The cause of your fever may not be known  This is called fever of unknown origin  It occurs when you have a fever above 100  9? F (38 3°C) for 3 weeks or more  The following are common causes of fever:  · An infection caused by a virus or bacteria    · An inflammatory disorder, such as arthritis    · A brain infection or injury    · Alcohol or illegal drug use, or withdrawal  What other signs and symptoms may I have? · Chills and shivers     · Muscle stiffness    · Weight loss    · Night sweats    · Fever that comes and goes  · Fever that is higher in the morning  How is the cause of a fever diagnosed? Your healthcare provider will ask when your fever began and how high it was  He or she will ask about other symptoms and examine you for signs of infection  He or she will feel your neck for lumps and listen to your heart and lungs  Tell your provider if you recently had surgery or an infection  Tell him or her if you have any medical conditions, such as diabetes or arthritis  You may also need blood or urine tests to check for infection  Ask about other tests you may need if blood and urine tests do not explain the cause of your fever  How is a fever treated? You may need any of the following, depending on the cause of your fever:  · NSAIDs , such as ibuprofen, help decrease swelling, pain, and fever  This medicine is available with or without a doctor's order  NSAIDs can cause stomach bleeding or kidney problems in certain people  If you take blood thinner medicine, always ask if NSAIDs are safe for you  Always read the medicine label and follow directions  Do not give these medicines to children under 10months of age without direction from your child's healthcare provider  · Acetaminophen  decreases pain and fever  It is available without a doctor's order  Ask how much to take and how often to take it  Follow directions  Read the labels of all other medicines you are using to see if they also contain acetaminophen, or ask your doctor or pharmacist  Acetaminophen can cause liver damage if not taken correctly  Do not use more than 4 grams (4,000 milligrams) total of acetaminophen in one day  · Antibiotics  may be given if you have an infection caused by bacteria  What can I do to be more comfortable while I have a fever? · Drink more liquids as directed  A fever makes you sweat  This can increase your risk for dehydration  Liquids can help prevent dehydration  ¨ Drink at least 6 to 8 eight-ounce cups of clear liquids each day  Drink water, juice, or broth  Do not drink sports drinks  They may contain caffeine  ¨ Ask your healthcare provider if you should drink an oral rehydration solution (ORS)  An ORS has the right amounts of water, salts, and sugar you need to replace body fluids  · Dress in lightweight clothes  Shivers may be a sign that your fever is rising  Do not put extra blankets or clothes on  This may cause your fever to rise even higher  Dress in light, comfortable clothing  Use a lightweight blanket or sheet when you sleep  Change your clothes, blanket, or sheets if they get wet  · Cool yourself safely  Take a bath in cool or lukewarm water  Use an ice pack wrapped in a small towel or wet a washcloth with cool water  Place the ice pack or wet washcloth on your forehead or the back of your neck  When should I seek immediate care? · Your fever does not go away or gets worse even after treatment      · You have a stiff neck and a bad headache  · You are confused  You may not be able to think clearly or remember things like you normally do  · Your heart beats faster than usual even after treatment  · You have shortness of breath or chest pain when you breathe  · You urinate small amounts or not at all  · Your skin, lips, or nails turn blue  When should I contact my healthcare provider? · You have abdominal pain or feel bloated  · You have nausea or are vomiting  · You have pain or burning when you urinate, or you have pain in your back  · You have questions or concerns about your condition or care  CARE AGREEMENT:   You have the right to help plan your care  Learn about your health condition and how it may be treated  Discuss treatment options with your caregivers to decide what care you want to receive  You always have the right to refuse treatment  The above information is an  only  It is not intended as medical advice for individual conditions or treatments  Talk to your doctor, nurse or pharmacist before following any medical regimen to see if it is safe and effective for you  © 2017 [employer]  Information is for End User's use only and may not be sold, redistributed or otherwise used for commercial purposes  All illustrations and images included in CareNotes® are the copyrighted property of [employer]  or [employer]  Fever in Adults   AMBULATORY CARE:   A fever  is an increase in your body temperature  Normal body temperature is 98 6°F (37°C)  Fever is generally defined as greater than 100 4°F (38°C)  Common causes include an infection, injury, or disease such as arthritis    Other signs and symptoms may include any of the following:   · Chills and shivers     · Muscle stiffness    · Weight loss    · Night sweats    · Fever that comes and goes    · Fever that is higher in the morning  Seek care immediately if:   · Your fever does not go away or gets worse even after treatment  · You have a stiff neck and a bad headache  · You are confused  You may not be able to think clearly or remember things like you normally do  · Your heart beats faster than usual even after treatment  · You have shortness of breath or chest pain when you breathe  · You urinate small amounts or not at all  · Your skin, lips, or nails turn blue  Contact your healthcare provider if:   · You have abdominal pain or you feel bloated  · You have nausea or are vomiting  · You have pain or burning when you urinate, or you have pain in your back  · You have questions or concerns about your condition or care  Treatment for a fever  may include any of the following:  · NSAIDs , such as ibuprofen, help decrease swelling, pain, and fever  This medicine is available with or without a doctor's order  NSAIDs can cause stomach bleeding or kidney problems in certain people  If you take blood thinner medicine, always ask if NSAIDs are safe for you  Always read the medicine label and follow directions  Do not give these medicines to children under 10months of age without direction from your child's healthcare provider  · Acetaminophen  decreases pain and fever  It is available without a doctor's order  Ask how much to take and how often to take it  Follow directions  Read the labels of all other medicines you are using to see if they also contain acetaminophen, or ask your doctor or pharmacist  Acetaminophen can cause liver damage if not taken correctly  Do not use more than 4 grams (4,000 milligrams) total of acetaminophen in one day  · Antibiotics  may be given if you have an infection caused by bacteria  · Take your medicine as directed  Contact your healthcare provider if you think your medicine is not helping or if you have side effects  Tell him of her if you are allergic to any medicine   Keep a list of the medicines, vitamins, and herbs you take  Include the amounts, and when and why you take them  Bring the list or the pill bottles to follow-up visits  Carry your medicine list with you in case of an emergency  Self-care:   · Drink more liquids as directed  A fever makes you sweat  This can increase your risk for dehydration  Liquids can help prevent dehydration  ¨ Drink at least 6 to 8 eight-ounce cups of clear liquids each day  Drink water, juice, or broth  Do not drink sports drinks  They may contain caffeine  ¨ Ask your healthcare provider if you should drink an oral rehydration solution (ORS)  An ORS has the right amounts of water, salts, and sugar you need to replace body fluids  · Dress in lightweight clothes  Shivers may be a sign that your fever is rising  Do not put extra blankets or clothes on  This may cause your fever to rise even higher  Dress in light, comfortable clothing  Use a lightweight blanket or sheet when you sleep  Change your clothes, blanket, or sheets if they get wet  · Cool yourself safely  Take a bath in cool or lukewarm water  Use an ice pack wrapped in a small towel or wet a washcloth with cool water  Place the ice pack or wet washcloth on your forehead or the back of your neck  Follow up with your healthcare provider as directed:  Write down your questions so you remember to ask them during your visits  © 2017 [employer] Information is for End User's use only and may not be sold, redistributed or otherwise used for commercial purposes  All illustrations and images included in CareNotes® are the copyrighted property of A D A M , Inc  or [employer]  The above information is an  only  It is not intended as medical advice for individual conditions or treatments  Talk to your doctor, nurse or pharmacist before following any medical regimen to see if it is safe and effective for you      Lightheadedness   AMBULATORY CARE:   Lightheadedness  is the feeling that you may faint, but you do not  Your heartbeat may be fast or feel like it flutters  Lightheadedness may occur when you take certain medicines, such as medicine to lower your blood pressure  Dehydration, low sodium, low blood sugar, an abnormal heart rhythm, and anxiety are other common causes  Seek care immediately if:   · You have sudden chest pain  · You have trouble breathing or shortness of breath  · You have vision changes, are sweating, and have nausea while you are sitting or lying down  · You feel flushed and your heart is fluttering  · You pass out  Contact your healthcare provider if:   · You feel lightheaded often  · Your heart beats faster or slower than usual      · You have questions or concerns about your condition or care  Manage your symptoms:  Talk with your healthcare provider about these and other ways to manage your symptoms:  · Lie down  when you feel lightheaded, your throat gets tight, or your vision changes  Raise your legs above the level of your heart  · Stand up slowly  Sit on the side of the bed or couch for a few minutes before you stand up  · Take slow, deep breaths when you feel lightheaded  This can help decrease the feeling that you might faint  · Ask if you need to avoid hot baths and saunas  These may make your symptoms worse  Watch for signs of low blood sugar: These include hunger, nervousness, sweating, and fast or fluttery heartbeats  Talk with your healthcare provider about ways to keep your blood sugar level steady  Check your blood pressure often:  You should do this especially if you take medicine to lower your blood pressure  Check your blood pressure when you are lying down and when you are standing  Ask how often to check during the day  Keep a record of your blood pressure numbers  Your healthcare provider may use the record to help plan your treatment    Keep a record of your lightheadedness episodes:  Include your symptoms and your activity before and after the episode  The record can help your healthcare provider find the cause of your lightheadedness and help you manage episodes  Follow up with your healthcare provider as directed: You may need more tests to help find the cause of your lightheadedness  The tests will help healthcare providers plan the best treatment for you  Write down your questions so you remember to ask them during your visits  © 2017 [address] [facility] Information is for End User's use only and may not be sold, redistributed or otherwise used for commercial purposes  All illustrations and images included in CareNotes® are the copyrighted property of [employer]  or [employer]  The above information is an  only  It is not intended as medical advice for individual conditions or treatments  Talk to your doctor, nurse or pharmacist before following any medical regimen to see if it is safe and effective for you

## 2018-01-10 NOTE — ASSESSMENT & PLAN NOTE
- patient was admitted for fever, likely related to viral illness, although RSV and flu swab negative as although reminder of infectious workup has been unrevealing   - chest x-ray negative, urinalysis without evidence of infection  - patient improved without antibiotics  - fever is resolved  - patient provided only supportive care, stable for discharge at rehabilitation later on today

## 2018-01-10 NOTE — CASE MANAGEMENT
Continued Stay Review    Date: 1-10-18    Vital Signs: /76   Pulse 60   Temp 99 4 °F (37 4 °C) (Oral)   Resp 16   Ht 5' 2" (1 575 m)   Wt 70 9 kg (156 lb 4 9 oz)   SpO2 95%   BMI 28 59 kg/m²     Medications:   Scheduled Meds:   apixaban 2 5 mg Oral BID   atorvastatin 10 mg Oral Daily   cholecalciferol 2,000 Units Oral Daily   clotrimazole  Topical BID   docusate sodium 100 mg Oral BID   guaiFENesin 600 mg Oral Q12H [address]   insulin lispro 1-5 Units Subcutaneous TID AC   insulin lispro 1-5 Units Subcutaneous HS   levothyroxine 25 mcg Oral Early Morning   lisinopril 10 mg Oral Daily   metoprolol tartrate 25 mg Oral Daily     Continuous Infusions:    PRN Meds:   acetaminophen    aluminum-magnesium hydroxide-simethicone    hydrALAZINE    meclizine    ondansetron    pneumococcal 13-valent conjugate vaccine    Abnormal Labs/Diagnostic Results:   Age/Sex: 80 y o  female     Assessment/Plan:  [address]        Discharge Plan:[facility] for a 3pm dc [facility]

## 2018-01-10 NOTE — DISCHARGE SUMMARY
Discharge- Radha Campbell 3/6/1931, 80 y o  female MRN: [id number]    [facility] Encounter: [id number]    Primary Care Provider: Rachel Hill MD   Date and time admitted to hospital: 1/5/2018  5:50 AM        * Fever   Assessment & Plan    - patient was admitted for fever, likely related to viral illness, although RSV and flu swab negative as although reminder of infectious workup has been unrevealing   - chest x-ray negative, urinalysis without evidence of infection  - patient improved without antibiotics  - fever is resolved  - patient provided only supportive care, stable for discharge at rehabilitation later on today          Type 2 diabetes mellitus ([facility] [address] )   Assessment & Plan    - patient is should continue with sliding scale at rehabilitation        Atrial fibrillation   Assessment & Plan    - status post ppm, she will follow up as outpatient with Cardiology  - continue with beta-blockers and Eliquis        Hypothyroidism   Assessment & Plan    - continue with Synthroid, TSH follow up with PCP        Essential hypertension   Assessment & Plan    - continue with lisinopril and metoprolol, blood pressure at the time of discharge stepped above, further titration might be needed at later time in rehab            Consultations During Hospital Stay:  · None    Procedures Performed:     · None    Significant Findings / Test Results:     · None    Incidental Findings:   · None    Test Results Pending at Discharge (will require follow up): · None     Outpatient Tests Requested:  · None    Complications:  None    Reason for Admission:  See H&P    Hospital Course:     Radha Campbell is a 80 y o  female patient who originally presented to the hospital on 1/5/2018 due to fever, resolved with supportive care  Please see above list of diagnoses and related plan for additional information       Condition at Discharge: good     Discharge Day Visit / Exam:     Subjective:  Patient feels well today, she is ready to go to rehabilitation  Vitals: Blood Pressure: 152/82 (01/10/18 1542)  Pulse: 60 (01/10/18 1539)  Temperature: 99 °F (37 2 °C) (01/10/18 1539)  Temp Source: Oral (01/10/18 1539)  Respirations: 16 (01/10/18 1539)  Height: 5' 2" (157 5 cm) (01/05/18 1915)  Weight - Scale: 70 9 kg (156 lb 4 9 oz) (01/05/18 1915)  SpO2: 96 % (01/10/18 1539)  Exam:   Physical Exam   Constitutional: She is oriented to person, place, and time  She appears well-developed  Cardiovascular: Normal rate, regular rhythm and normal heart sounds  Exam reveals no friction rub  No murmur heard  Pulmonary/Chest: Effort normal and breath sounds normal  No respiratory distress  She has no wheezes  She has no rales  Abdominal: Soft  Bowel sounds are normal  She exhibits no distension  There is no tenderness  There is no rebound  Musculoskeletal: She exhibits no edema  Neurological: She is alert and oriented to person, place, and time  She exhibits normal muscle tone  Skin: Skin is warm  Psychiatric: She has a normal mood and affect  Discussion with Family:  Daughter in law, the day prior to discharge over the phone    Discharge instructions/Information to patient and family:   See after visit summary for information provided to patient and family  Provisions for Follow-Up Care:  See after visit summary for information related to follow-up care and any pertinent home health orders  Disposition:     Other: Other rehabilitation center in [address] to [facility]:   · Not applicable - Not Applicable to this Patient    Planned Readmission:  No     Discharge Statement:  I spent greater than 30 minutes discharging the patient  This time was spent on the day of discharge  I had direct contact with the patient on the day of discharge   Greater than 50% of the total time was spent examining patient, answering all patient questions, arranging and discussing plan of care with patient as well as directly providing post-discharge instructions  Additional time then spent on discharge activities  Discharge Medications:  See after visit summary for reconciled discharge medications provided to patient and family        ** Please Note: This note has been constructed using a voice recognition system **

## 2018-01-10 NOTE — SOCIAL WORK
CM was informed that pt is medically stable for dc today  CM spoke to pts daughter in-law Garrett Llanes to discuss WC [facility] cost--Jenn is agreeable  CM arranged with [facility] for a 3pm dc to [facility]  CM notified pts daughter in-law Garrett Llanes, pts bedside RN Will Silvestre, and Shilpa Bradley at [facility] of dc time  Facility transfer form completed  Chart copy requested

## 2018-01-10 NOTE — RESTORATIVE TECHNICIAN NOTE
Restorative Specialist Mobility Note       Activity: Ambulate in room, Chair, Dangle, Stand at bedside (Educated/encouraged pt to ambulate w/assistance 3-4 x's/day  Chair alarm on   Pt callbell, phone/tray within reach )     Assistive Device: Front wheel walker          [employer] BS, Restorative Technician, [employer]

## 2018-01-10 NOTE — RESPIRATORY THERAPY NOTE
RT Protocol Note  Christel Riggins 80 y o  female MRN: [id number]  Unit/Bed#: [facility] 725-01 Encounter: [id number]    Assessment    Principal Problem:    Fever  Active Problems:    Essential hypertension    Hypothyroidism    Atrial fibrillation    Type 2 diabetes mellitus (HCC)    Dizziness      Home Pulmonary Medications:  none    Past Medical History:   Diagnosis Date    Diabetes mellitus ([address] )     Disease of thyroid gland     Hyperlipidemia     Hypertension     Pacemaker      Social History     Social History    Marital status: /Civil Union     Spouse name: N/A    Number of children: N/A    Years of education: N/A     Social History Main Topics    Smoking status: Never Smoker    Smokeless tobacco: Never Used    Alcohol use No    Drug use: No    Sexual activity: No     Other Topics Concern    None     Social History Narrative    None       Subjective         Objective    Physical Exam:   Assessment Type: Assess only  General Appearance: Sleeping  Bilateral Breath Sounds: Clear    Vitals:  Blood pressure 157/76, pulse 60, temperature 99 4 °F (37 4 °C), temperature source Oral, resp  rate 16, height 5' 2" (1 575 m), weight 70 9 kg (156 lb 4 9 oz), SpO2 95 %  Imaging and other studies: I have personally reviewed pertinent reports  Plan    Respiratory Plan: (P) Discontinue Protocol        Resp Comments: (P) d/c udn prn, d/c from protocol, pt has no resp meds or pulmonary hx, last xchest xray on 1/5 shows clear lungs, pt has not been gicven a udn in over 3 days bs are clear at this time and pt is on RMA, no respiratory intervention is needed at this time

## 2018-01-10 NOTE — ASSESSMENT & PLAN NOTE
- continue with lisinopril and metoprolol, blood pressure at the time of discharge stepped above, further titration might be needed at later time in rehab

## 2018-01-18 NOTE — PROGRESS NOTES
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)   3  Atherosclerotic peripheral vascular disease (440 20) (I70 209)   4  Diabetes mellitus with polyneuropathy (250 60,357 2) (E11 42)   5  Pain in both feet (729 5) (M79 671,M79 672)    Plan    · Follow-up visit in 1 month Evaluation and Treatment  Follow-up  Status: Hold For -  Scheduling  Requested for: 02Aug2016   · Martin Huntley your feet daily ; Status:Complete;   Done: 02Aug2016 12:54PM    Discussion/Summary    Discussed proper diabetic foot care  Daily foot checks monitor for signs of infection  The treatment plan was reviewed with the patient/guardian  The patient/guardian understands and agrees with the treatment plan      Chief Complaint  Patient is a diabetic with pain and burning in feet  Painful calluses  Has not noticed any redness or infection      Active Problems    1  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)   2  Atherosclerotic peripheral vascular disease (440 20) (I70 209)   3  Callus (700) (L84)   4  Diabetes mellitus with polyneuropathy (250 60,357 2) (E11 42)   5  Onychomycosis (110 1) (B35 1)   6  Pain in both feet (729 5) (M79 671,M79 672)    Social History    · Never a smoker    Current Meds   1  Acetaminophen 325 MG Oral Tablet; Therapy: (Recorded:29Jun2016) to Recorded   2  Atorvastatin Calcium 10 MG Oral Tablet; Therapy: (Recorded:29Jun2016) to Recorded   3  Clotrimazole-Betamethasone 1-0 05 % External Cream;   Therapy: (Recorded:29Jun2016) to Recorded   4  Eliquis TABS; Therapy: (Recorded:29Jun2016) to Recorded   5  Ferrex 150 Plus 150-50-50 MG Oral Capsule; Therapy: (Gabi Ola) to Recorded   6  Glucagon Emergency 1 MG Injection Kit; Therapy: (Gabi Ola) to Recorded   7  HumaLOG SOLN;   Therapy: (Recorded:29Jun2016) to Recorded   8  Irbesartan 150 MG Oral Tablet; Therapy: (Recorded:29Jun2016) to Recorded   9  Levothyroxine Sodium 25 MCG CAPS;    Therapy: (Roxanna Lopez) to Recorded   10  Metoprolol Tartrate TABS; Therapy: (Recorded:29Jun2016) to Recorded   11  Milk of Magnesia SUSP; Therapy: (Recorded:29Jun2016) to Recorded   12  Potassium Chloride 10 MEQ TBCR; Therapy: (Recorded:29Jun2016) to Recorded    The medication list was reviewed and updated today  Allergies    1  No Known Drug Allergies    Vitals   Recorded: 01Aug2016 01:22PM   Height 4 ft 3 in   Weight 124 lb    BMI Calculated 33 52   BSA Calculated 1 35     Physical Exam  Left Foot: Appearance: a deformity  Great toe deformities include a bunion  Second toe deformities include hammer toe  Third toe deformities include hammer toe  Forth toe deformities include hammer toe  Fifth toe deformities include hammer toe  Right Foot: Appearance: a deformity  Second toe deformities include hammer toe  Third toe deformities include hammer toe  Forth toe deformities include hammer toe  Fifth toe deformities include hammer toe  Neurological Exam: performed  Light touch was decreased bilaterally  Vibratory sensation was absent bilaterally  Response to monofilament test was absent bilaterally  Toenails: All of the toenails were elongated, hypertrophied, shown to have subungual debris and tender  Socks and shoes removed, the Right Foot: the foot was with a(n) ~Ucm3 callus  The sensory exam showed diminished vibratory sensation at the level of the toes and diminished position sense at the level of the toes  Diminished tactile sensation with monofilament testing throughout the right foot  Socks and shoes removed, Left Foot: the foot was with a(n) ~Ucm3 callus  The sensory exam showed diminished vibratory sensation at the level of the toes and diminished position sense at the level of the toes  Diminished tactile sensation with monofilament testing throughout the left foot  Capillary refills findings on the right were delayed in the toes     Pulses:   1+ in the posterior tibialis on the right   0 in the dorsalis pedis on the right  Capillary refills findings on the left were delayed in the toes  Pulses:   1+ in the posterior tibialis on the left   0 in the dorsalis pedis on the left  Assign Risk Category: 2: Loss of protective sensation with or without weakness, deformity, callus, pre-ulcer, or history of ulceration  High risk  Hyperkeratosis: present on the left first sub metatarsal, present on the right second sub metatarsal, present on the right fourth sub metatarsal and present on the left fourth sub metatarsal       Procedure  Mold of feet taken for diabetic shoes and diabetic inserts      Future Appointments    Date/Time Provider Specialty Site   08/31/2016 11:30 AM Rachel Hobbs DPM Podiatry [address] [facility]     Signatures   Electronically signed by :  Orlando Castillo DPM; Aug  2 2016 12:53PM EST                       (Author)

## 2018-01-23 VITALS — DIASTOLIC BLOOD PRESSURE: 90 MMHG | HEART RATE: 60 BPM | SYSTOLIC BLOOD PRESSURE: 120 MMHG

## 2018-03-07 NOTE — PROGRESS NOTES
"  Discussion/Summary  Normal device function      Results/Data  Cardiac Device In Clinic [id number] 06:37PM Jessie Haile     Test Name Result Flag Reference   MISCELLANEOUS COMMENT (Report)     WOUND CHECK: INCISION CLEAN AND DRY WITH EDGES APPROXIMATED; WOUND CARE AND RESTRICTIONS REVIEWED WITH PATIENT  DEVICE INTERROGATED IN THE [place] OFFICE: BATTERY VOLTAGE ADEQUATE   98 6% ( >40% SBRAD)  ALL LEAD PARAMETERS WITHIN NORMAL LIMITS  NO SIGNIFICANT HIGH RATE EPISODES  NO PROGRAMMING CHANGES MADE TO DEVICE PARAMETERS  NORMAL DEVICE FUNCTION  NC   Cardiac Electrophysiology Report      [id number]  pdf   DEVICE TYPE Pacemaker       Cardiac Electrophysiology Report [id number] 06:37PM Jessie Haile     Test Name Result Flag Reference   Cardiac Electrophysiology Report      [id number]  pdf     Signatures   Electronically signed by : Clemente Canchola, ; May 22 2017  2:49PM EST                       (Author)    Electronically signed by : Larry Gibson DO; May 23 2017  2:05PM EST                       (Author)    "

## 2018-07-09 DIAGNOSIS — E78.2 MIXED HYPERLIPIDEMIA: ICD-10-CM

## 2018-07-09 DIAGNOSIS — K59.01 SLOW TRANSIT CONSTIPATION: ICD-10-CM

## 2018-07-09 DIAGNOSIS — I10 ESSENTIAL HYPERTENSION: ICD-10-CM

## 2018-07-09 DIAGNOSIS — I48.20 ATRIAL FIBRILLATION, CHRONIC (HCC): Primary | ICD-10-CM

## 2018-07-09 DIAGNOSIS — E03.4 HYPOTHYROIDISM DUE TO ACQUIRED ATROPHY OF THYROID: ICD-10-CM

## 2018-07-09 DIAGNOSIS — E55.9 VITAMIN D DEFICIENCY: ICD-10-CM

## 2018-07-11 RX ORDER — CHOLECALCIFEROL (VITAMIN D3) 50 MCG
TABLET ORAL
Qty: 28 TABLET | Refills: 11 | Status: SHIPPED | OUTPATIENT
Start: 2018-07-11

## 2018-07-11 RX ORDER — LISINOPRIL 20 MG/1
TABLET ORAL
Qty: 28 TABLET | Refills: 0 | Status: SHIPPED | OUTPATIENT
Start: 2018-07-11 | End: 2018-08-06 | Stop reason: SDUPTHER

## 2018-07-11 RX ORDER — DOCUSATE SODIUM 100 MG/1
CAPSULE, LIQUID FILLED ORAL
Qty: 56 CAPSULE | Refills: 11 | Status: SHIPPED | OUTPATIENT
Start: 2018-07-11

## 2018-07-11 RX ORDER — APIXABAN 2.5 MG/1
TABLET, FILM COATED ORAL
Qty: 56 TABLET | Refills: 5 | Status: SHIPPED | OUTPATIENT
Start: 2018-07-11 | End: 2019-01-10 | Stop reason: SDUPTHER

## 2018-07-11 RX ORDER — METOPROLOL TARTRATE 50 MG/1
TABLET, FILM COATED ORAL
Qty: 56 TABLET | Refills: 5 | Status: SHIPPED | OUTPATIENT
Start: 2018-07-11 | End: 2019-01-10 | Stop reason: SDUPTHER

## 2018-07-11 RX ORDER — ATORVASTATIN CALCIUM 10 MG/1
TABLET, FILM COATED ORAL
Qty: 28 TABLET | Refills: 0 | Status: SHIPPED | OUTPATIENT
Start: 2018-07-11 | End: 2018-08-06 | Stop reason: SDUPTHER

## 2018-07-11 RX ORDER — LEVOTHYROXINE SODIUM 0.03 MG/1
TABLET ORAL
Qty: 28 TABLET | Refills: 11 | Status: SHIPPED | OUTPATIENT
Start: 2018-07-11

## 2018-07-31 ENCOUNTER — TELEPHONE (OUTPATIENT)
Dept: GERIATRICS | Age: 83
End: 2018-07-31

## 2018-07-31 NOTE — TELEPHONE ENCOUNTER
daughter in [place] would like letter medicare and [facility] ambulance ride January 5th admit to [facility] then rehab       email [email or web address]    Mailing address [address], [place]     Second appeal says no need to resend documents sent previously  Already sent pages page 50-52 and 1-4 of discharge summary    [phone] call back number for [facility]

## 2018-08-06 DIAGNOSIS — E78.2 MIXED HYPERLIPIDEMIA: ICD-10-CM

## 2018-08-06 DIAGNOSIS — I10 ESSENTIAL HYPERTENSION: ICD-10-CM

## 2018-08-06 RX ORDER — LISINOPRIL 20 MG/1
TABLET ORAL
Qty: 28 TABLET | Refills: 5 | Status: SHIPPED | OUTPATIENT
Start: 2018-08-06 | End: 2019-03-12 | Stop reason: SDUPTHER

## 2018-08-06 RX ORDER — ATORVASTATIN CALCIUM 10 MG/1
TABLET, FILM COATED ORAL
Qty: 28 TABLET | Refills: 5 | Status: SHIPPED | OUTPATIENT
Start: 2018-08-06 | End: 2019-03-12 | Stop reason: SDUPTHER

## 2018-08-07 ENCOUNTER — IN HOME VISIT (OUTPATIENT)
Dept: GERIATRICS | Age: 83
End: 2018-08-07
Payer: MEDICARE

## 2018-08-07 VITALS — HEART RATE: 60 BPM | DIASTOLIC BLOOD PRESSURE: 78 MMHG | SYSTOLIC BLOOD PRESSURE: 142 MMHG | TEMPERATURE: 97.7 F

## 2018-08-07 DIAGNOSIS — E78.49 OTHER HYPERLIPIDEMIA: ICD-10-CM

## 2018-08-07 DIAGNOSIS — E11.9 TYPE 2 DIABETES MELLITUS WITHOUT COMPLICATION, WITHOUT LONG-TERM CURRENT USE OF INSULIN (HCC): ICD-10-CM

## 2018-08-07 DIAGNOSIS — I10 ESSENTIAL HYPERTENSION: Primary | ICD-10-CM

## 2018-08-07 DIAGNOSIS — E03.8 OTHER SPECIFIED HYPOTHYROIDISM: ICD-10-CM

## 2018-08-07 PROCEDURE — 99335 PR DOM/R-HOME E/M EST PT LW MOD SEVERITY 25 MINUTES: CPT | Performed by: NURSE PRACTITIONER

## 2018-08-07 NOTE — PROGRESS NOTES
Assessment/Plan:    No problem-specific Assessment & Plan notes found for this encounter  Diagnoses and all orders for this visit:    Essential hypertension  Comments:  -controlled with lisinopril 20mg QD and metoprolol 50mg BID  -check BMP and CBC    Other specified hypothyroidism  Comments:  -TSH 1/6/18 was 2 22  -levothyroxine 25mcg QD    Type 2 diabetes mellitus without complication, without long-term current use of insulin ([facility] [address] )  Comments:  -not on any medications for diabetes  -HbA1c checked 1/6/18 and was 7 6%    Other hyperlipidemia  Comments:  -atorvastatin 10mg QD          Subjective:      Patient ID: Lavinia Matamoros is a 80 y o  female  80year old female presents for routine exam  Staff and pt offers no concerns  Pt requests steroid knee injections frequently and again asks today  However, pt denies knee pain  The following portions of the patient's history were reviewed and updated as appropriate: allergies, current medications, past social history, past surgical history and problem list     Review of Systems   Constitutional: Negative for chills and fever  HENT: Negative for congestion and sore throat  Respiratory: Negative for cough and shortness of breath  Cardiovascular: Negative for chest pain and leg swelling  Gastrointestinal: Negative for abdominal pain, constipation, diarrhea, nausea and vomiting  Genitourinary: Negative for dysuria  Musculoskeletal: Negative for back pain, joint swelling and myalgias  Skin: Negative for color change  Neurological: Negative for dizziness and headaches  Psychiatric/Behavioral: Negative for behavioral problems  Objective:      /78 (BP Location: Right arm, Patient Position: Sitting, Cuff Size: Standard)   Pulse 60   Temp 97 7 °F (36 5 °C)          Physical Exam   Constitutional: She is oriented to person, place, and time  She appears well-developed  She is cooperative  No distress     HENT:   Head: Normocephalic and atraumatic  Eyes: Conjunctivae are normal  Right eye exhibits no discharge  Left eye exhibits no discharge  Neck: No JVD present  No thyromegaly present  Cardiovascular: Normal rate, regular rhythm and normal heart sounds  Pulmonary/Chest: Effort normal and breath sounds normal  No tachypnea  No respiratory distress  She has no wheezes  She has no rales  She exhibits no tenderness  Pacemaker present to left upper chest    Abdominal: Soft  Bowel sounds are normal  She exhibits no distension  There is no tenderness  Musculoskeletal: She exhibits edema  Trace pitting edema to BLEs, no calf tenderness  No redness or swelling of knees, no pain with palpation of B/L knee joints  No pain with ROM of BLEs   Lymphadenopathy:     She has no cervical adenopathy  Neurological: She is alert and oriented to person, place, and time  Is oriented to place and time (year but not month)   Skin: Skin is warm and dry  She is not diaphoretic  Psychiatric: She has a normal mood and affect   Her behavior is normal

## 2018-10-04 ENCOUNTER — IMMUNIZATION (OUTPATIENT)
Dept: GERIATRICS | Age: 83
End: 2018-10-04
Payer: MEDICARE

## 2018-10-04 DIAGNOSIS — Z23 ENCOUNTER FOR IMMUNIZATION: ICD-10-CM

## 2018-10-04 PROCEDURE — 90662 IIV NO PRSV INCREASED AG IM: CPT | Performed by: NURSE PRACTITIONER

## 2018-10-04 PROCEDURE — G0008 ADMIN INFLUENZA VIRUS VAC: HCPCS | Performed by: NURSE PRACTITIONER

## 2018-12-04 ENCOUNTER — IN HOME VISIT (OUTPATIENT)
Dept: GERIATRICS | Age: 83
End: 2018-12-04
Payer: MEDICARE

## 2018-12-04 VITALS
SYSTOLIC BLOOD PRESSURE: 144 MMHG | DIASTOLIC BLOOD PRESSURE: 86 MMHG | RESPIRATION RATE: 16 BRPM | HEART RATE: 63 BPM | TEMPERATURE: 99.3 F

## 2018-12-04 DIAGNOSIS — E78.2 MIXED HYPERLIPIDEMIA: ICD-10-CM

## 2018-12-04 DIAGNOSIS — E03.8 OTHER SPECIFIED HYPOTHYROIDISM: Primary | ICD-10-CM

## 2018-12-04 DIAGNOSIS — I48.0 PAROXYSMAL ATRIAL FIBRILLATION (HCC): ICD-10-CM

## 2018-12-04 DIAGNOSIS — I10 ESSENTIAL HYPERTENSION: ICD-10-CM

## 2018-12-04 PROCEDURE — 99335 PR DOM/R-HOME E/M EST PT LW MOD SEVERITY 25 MINUTES: CPT | Performed by: NURSE PRACTITIONER

## 2018-12-04 NOTE — PROGRESS NOTES
Assessment/Plan:    No problem-specific Assessment & Plan notes found for this encounter  Diagnoses and all orders for this visit:    Other specified hypothyroidism  Comments:  -levothyroxine 25mcg daily  -check TSH with reflex T4    Essential hypertension  Comments:  -Vital signs reviewed  -BP controlled with lisinopril 20mg QD and metoprolol 50mg BID  -check CBC and BMP    Paroxysmal atrial fibrillation (HCC)  Comments:  -Eliquis 2 5mg BID  -metoprolol 50mg BID    Mixed hyperlipidemia  Comments:  -medications reviewed  -continues on atorvastatin 10mg daily          Subjective:      Patient ID: Gail Fitzgerald is a 80 y o  female  80year old female being seen routine exam   Staff and patient offer no concerns  Pt reports "noisy" knees and requesting knee injections as usual  However, she denies knee discomfort  Discussed holding off on injections unless she experiences pain  She is agreeable to this  However, this discussion is had repeatedly and she asks for knee injections at each visit  Discussed risk and benefit today and will hold off on injections  Medication list reviewed  Vital signs log from [facility] reviewed  The following portions of the patient's history were reviewed and updated as appropriate: allergies, current medications and problem list     Review of Systems   Constitutional: Negative for chills and fever  HENT: Negative for congestion and sore throat  Eyes: Negative for pain  Respiratory: Negative for cough and shortness of breath  Cardiovascular: Negative for chest pain and leg swelling  Gastrointestinal: Negative for abdominal pain, constipation, diarrhea, nausea and vomiting  Genitourinary: Negative for dysuria  Musculoskeletal: Negative for arthralgias and myalgias  "Noisy" knees   Skin: Negative for color change  Neurological: Negative for dizziness and headaches  Psychiatric/Behavioral: Negative for behavioral problems     All other systems reviewed and are negative  Objective:      /86 (BP Location: Right arm, Patient Position: Sitting, Cuff Size: Standard)   Pulse 63   Temp 99 3 °F (37 4 °C)   Resp 16          Physical Exam   Constitutional: She is cooperative  No distress  HENT:   Head: Normocephalic and atraumatic  Eyes: Conjunctivae are normal  Right eye exhibits no discharge  Left eye exhibits no discharge  Neck: No JVD present  No tracheal deviation present  No thyromegaly present  Cardiovascular: Normal rate, regular rhythm and normal heart sounds  Pulmonary/Chest: Effort normal and breath sounds normal  No tachypnea  No respiratory distress  She has no wheezes  She has no rales  She exhibits no tenderness  No cough  No SOB   Abdominal: Soft  She exhibits no distension  There is no tenderness  There is no guarding  Musculoskeletal: She exhibits no edema or tenderness  Lymphadenopathy:     She has no cervical adenopathy  Neurological: She is alert  Alert, pleasant, and cooperative   Skin: Skin is warm and dry  She is not diaphoretic  Psychiatric: She has a normal mood and affect   Her behavior is normal

## 2018-12-20 LAB
LEFT EYE DIABETIC RETINOPATHY: NORMAL
RIGHT EYE DIABETIC RETINOPATHY: NORMAL

## 2019-01-10 DIAGNOSIS — I48.20 ATRIAL FIBRILLATION, CHRONIC (HCC): ICD-10-CM

## 2019-01-10 RX ORDER — APIXABAN 2.5 MG/1
TABLET, FILM COATED ORAL
Qty: 56 TABLET | Refills: 4 | Status: SHIPPED | OUTPATIENT
Start: 2019-01-10

## 2019-01-10 RX ORDER — METOPROLOL TARTRATE 50 MG/1
TABLET, FILM COATED ORAL
Qty: 56 TABLET | Refills: 4 | Status: SHIPPED | OUTPATIENT
Start: 2019-01-10

## 2019-02-04 ENCOUNTER — HOSPITAL ENCOUNTER (EMERGENCY)
Facility: HOSPITAL | Age: 84
Discharge: HOME | End: 2019-02-05
Attending: EMERGENCY MEDICINE | Admitting: EMERGENCY MEDICINE
Payer: MEDICARE

## 2019-02-04 ENCOUNTER — APPOINTMENT (EMERGENCY)
Dept: RADIOLOGY | Facility: HOSPITAL | Age: 84
End: 2019-02-04
Payer: MEDICARE

## 2019-02-04 DIAGNOSIS — R05.9 COUGH: Primary | ICD-10-CM

## 2019-02-04 LAB
ALBUMIN SERPL BCP-MCNC: 3.4 G/DL (ref 3.5–5)
ALP SERPL-CCNC: 100 U/L (ref 46–116)
ALT SERPL W P-5'-P-CCNC: 22 U/L (ref 12–78)
ANION GAP SERPL CALCULATED.3IONS-SCNC: 10 MMOL/L (ref 4–13)
AST SERPL W P-5'-P-CCNC: 27 U/L (ref 5–45)
BASOPHILS # BLD AUTO: 0.01 THOUSANDS/ΜL (ref 0–0.1)
BASOPHILS NFR BLD AUTO: 0 % (ref 0–1)
BILIRUB SERPL-MCNC: 0.48 MG/DL (ref 0.2–1)
BUN SERPL-MCNC: 18 MG/DL (ref 5–25)
CALCIUM SERPL-MCNC: 8.4 MG/DL (ref 8.3–10.1)
CHLORIDE SERPL-SCNC: 103 MMOL/L (ref 100–108)
CO2 SERPL-SCNC: 23 MMOL/L (ref 21–32)
CREAT SERPL-MCNC: 1.03 MG/DL (ref 0.6–1.3)
EOSINOPHIL # BLD AUTO: 0 THOUSAND/ΜL (ref 0–0.61)
EOSINOPHIL NFR BLD AUTO: 0 % (ref 0–6)
ERYTHROCYTE [DISTWIDTH] IN BLOOD BY AUTOMATED COUNT: 13.6 % (ref 11.6–15.1)
GFR SERPL CREATININE-BSD FRML MDRD: 49 ML/MIN/1.73SQ M
GLUCOSE SERPL-MCNC: 176 MG/DL (ref 65–140)
HCT VFR BLD AUTO: 37.2 % (ref 34.8–46.1)
HGB BLD-MCNC: 11.7 G/DL (ref 11.5–15.4)
IMM GRANULOCYTES # BLD AUTO: 0.03 THOUSAND/UL (ref 0–0.2)
IMM GRANULOCYTES NFR BLD AUTO: 1 % (ref 0–2)
LYMPHOCYTES # BLD AUTO: 1.85 THOUSANDS/ΜL (ref 0.6–4.47)
LYMPHOCYTES NFR BLD AUTO: 37 % (ref 14–44)
MCH RBC QN AUTO: 30 PG (ref 26.8–34.3)
MCHC RBC AUTO-ENTMCNC: 31.5 G/DL (ref 31.4–37.4)
MCV RBC AUTO: 95 FL (ref 82–98)
MONOCYTES # BLD AUTO: 0.95 THOUSAND/ΜL (ref 0.17–1.22)
MONOCYTES NFR BLD AUTO: 19 % (ref 4–12)
NEUTROPHILS # BLD AUTO: 2.17 THOUSANDS/ΜL (ref 1.85–7.62)
NEUTS SEG NFR BLD AUTO: 43 % (ref 43–75)
NRBC BLD AUTO-RTO: 0 /100 WBCS
NT-PROBNP SERPL-MCNC: 2664 PG/ML
PLATELET # BLD AUTO: 168 THOUSANDS/UL (ref 149–390)
PMV BLD AUTO: 10.7 FL (ref 8.9–12.7)
POTASSIUM SERPL-SCNC: 3.7 MMOL/L (ref 3.5–5.3)
PROT SERPL-MCNC: 7.3 G/DL (ref 6.4–8.2)
RBC # BLD AUTO: 3.9 MILLION/UL (ref 3.81–5.12)
SODIUM SERPL-SCNC: 136 MMOL/L (ref 136–145)
TROPONIN I SERPL-MCNC: 0.04 NG/ML
WBC # BLD AUTO: 5.01 THOUSAND/UL (ref 4.31–10.16)

## 2019-02-04 PROCEDURE — 71046 X-RAY EXAM CHEST 2 VIEWS: CPT

## 2019-02-04 PROCEDURE — 84484 ASSAY OF TROPONIN QUANT: CPT | Performed by: EMERGENCY MEDICINE

## 2019-02-04 PROCEDURE — 99284 EMERGENCY DEPT VISIT MOD MDM: CPT

## 2019-02-04 PROCEDURE — 83880 ASSAY OF NATRIURETIC PEPTIDE: CPT | Performed by: EMERGENCY MEDICINE

## 2019-02-04 PROCEDURE — 85025 COMPLETE CBC W/AUTO DIFF WBC: CPT | Performed by: EMERGENCY MEDICINE

## 2019-02-04 PROCEDURE — 94640 AIRWAY INHALATION TREATMENT: CPT

## 2019-02-04 PROCEDURE — 36415 COLL VENOUS BLD VENIPUNCTURE: CPT | Performed by: EMERGENCY MEDICINE

## 2019-02-04 PROCEDURE — 93005 ELECTROCARDIOGRAM TRACING: CPT

## 2019-02-04 PROCEDURE — 80053 COMPREHEN METABOLIC PANEL: CPT | Performed by: EMERGENCY MEDICINE

## 2019-02-04 RX ORDER — ALBUTEROL SULFATE 2.5 MG/3ML
5 SOLUTION RESPIRATORY (INHALATION) ONCE
Status: COMPLETED | OUTPATIENT
Start: 2019-02-04 | End: 2019-02-05

## 2019-02-05 ENCOUNTER — IN HOME VISIT (OUTPATIENT)
Dept: GERIATRICS | Age: 84
End: 2019-02-05
Payer: MEDICARE

## 2019-02-05 ENCOUNTER — APPOINTMENT (EMERGENCY)
Dept: RADIOLOGY | Facility: HOSPITAL | Age: 84
End: 2019-02-05
Payer: MEDICARE

## 2019-02-05 VITALS
OXYGEN SATURATION: 97 % | BODY MASS INDEX: 22.86 KG/M2 | RESPIRATION RATE: 18 BRPM | SYSTOLIC BLOOD PRESSURE: 142 MMHG | HEART RATE: 60 BPM | DIASTOLIC BLOOD PRESSURE: 69 MMHG | TEMPERATURE: 99 F | WEIGHT: 125 LBS

## 2019-02-05 VITALS
SYSTOLIC BLOOD PRESSURE: 126 MMHG | DIASTOLIC BLOOD PRESSURE: 72 MMHG | RESPIRATION RATE: 18 BRPM | TEMPERATURE: 98.4 F | HEART RATE: 64 BPM

## 2019-02-05 DIAGNOSIS — I10 ESSENTIAL HYPERTENSION: ICD-10-CM

## 2019-02-05 DIAGNOSIS — R06.2 WHEEZING: Primary | ICD-10-CM

## 2019-02-05 DIAGNOSIS — E78.49 OTHER HYPERLIPIDEMIA: ICD-10-CM

## 2019-02-05 DIAGNOSIS — E03.8 OTHER SPECIFIED HYPOTHYROIDISM: ICD-10-CM

## 2019-02-05 DIAGNOSIS — I48.0 PAROXYSMAL ATRIAL FIBRILLATION (HCC): ICD-10-CM

## 2019-02-05 LAB
ATRIAL RATE: 63 BPM
ATRIAL RATE: 78 BPM
GLUCOSE SERPL-MCNC: 173 MG/DL (ref 65–140)
P AXIS: 0 DEGREES
QRS AXIS: -73 DEGREES
QRS AXIS: -76 DEGREES
QRSD INTERVAL: 154 MS
QRSD INTERVAL: 156 MS
QT INTERVAL: 436 MS
QT INTERVAL: 454 MS
QTC INTERVAL: 464 MS
QTC INTERVAL: 497 MS
T WAVE AXIS: 102 DEGREES
T WAVE AXIS: 103 DEGREES
TROPONIN I SERPL-MCNC: 0.04 NG/ML
VENTRICULAR RATE: 63 BPM
VENTRICULAR RATE: 78 BPM

## 2019-02-05 PROCEDURE — 84484 ASSAY OF TROPONIN QUANT: CPT | Performed by: EMERGENCY MEDICINE

## 2019-02-05 PROCEDURE — 93005 ELECTROCARDIOGRAM TRACING: CPT

## 2019-02-05 PROCEDURE — 36415 COLL VENOUS BLD VENIPUNCTURE: CPT | Performed by: EMERGENCY MEDICINE

## 2019-02-05 PROCEDURE — 82948 REAGENT STRIP/BLOOD GLUCOSE: CPT

## 2019-02-05 PROCEDURE — 93010 ELECTROCARDIOGRAM REPORT: CPT | Performed by: INTERNAL MEDICINE

## 2019-02-05 PROCEDURE — 99336 PR DOM/R-HOME E/M EST PT MOD HI SEVERITY 40 MINUTES: CPT | Performed by: NURSE PRACTITIONER

## 2019-02-05 PROCEDURE — 71250 CT THORAX DX C-: CPT

## 2019-02-05 RX ORDER — ONDANSETRON 4 MG/1
4 TABLET, ORALLY DISINTEGRATING ORAL ONCE
Status: DISCONTINUED | OUTPATIENT
Start: 2019-02-05 | End: 2019-02-05

## 2019-02-05 RX ORDER — ACETAMINOPHEN 325 MG/1
650 TABLET ORAL ONCE
Status: DISCONTINUED | OUTPATIENT
Start: 2019-02-05 | End: 2019-02-05

## 2019-02-05 RX ADMIN — IPRATROPIUM BROMIDE 0.5 MG: 0.5 SOLUTION RESPIRATORY (INHALATION) at 00:33

## 2019-02-05 RX ADMIN — ALBUTEROL SULFATE 5 MG: 2.5 SOLUTION RESPIRATORY (INHALATION) at 00:33

## 2019-02-05 NOTE — PROGRESS NOTES
Assessment/Plan:    No problem-specific Assessment & Plan notes found for this encounter  Diagnoses and all orders for this visit:    Wheezing  Comments:  -seen in ER-records reviewed  -CXR negative  -Duoneb TID x 5 days  -Robitussin QID x 5 days  -afebrile    Paroxysmal atrial fibrillation (HCC)  Comments:  -Eliquis BID and metoprolol 50mg BID  -medications reviewed    Essential hypertension  Comments:  -controlled with lisinopril 20mg QD and on metoprolol 50mg BID    Other specified hypothyroidism  Comments:  -levothyroxine 25mcg daily  -TSH checked 12/5/18 and was 4 19 with reflex T4 of 1 24    Other hyperlipidemia  Comments:  -atorvastatin 10mg QD  -DME paperwork to be completed once received from [facility]          Subjective:      Patient ID: Aba Lynne is a 80 y o  female  80year old female being seen for annual exam and completion of DME paperwork  Staff report she was sent out to the ER last night for evaluation  [facility] ER records show she was sent for cough x3 days  CXR negative  Had nebulizer and blood work  Returned to facility today  She reports a productive cough for clear mucous  She denies sor ethroat or SOB  She denies chest pain or abdominal pain  No other concerns are presented today by either [facility] staff or patient  Medications reviewed-she continues on Eliquis and metorpolol for Afib  She is also on lisinopril and metoprolol for HTN, which is controlled  Pt continues to report "cracking" in both knees, for which she has previously receives injections, but denies knee pain  The following portions of the patient's history were reviewed and updated as appropriate: allergies, current medications and problem list     Review of Systems   Constitutional: Negative for chills and fever  HENT: Negative for congestion and sore throat  Eyes: Negative for pain  Respiratory: Positive for cough  Negative for shortness of breath      Cardiovascular: Negative for chest pain and leg swelling  Gastrointestinal: Negative for abdominal pain, constipation, diarrhea, nausea and vomiting  Genitourinary: Negative for dysuria  Musculoskeletal: Negative for back pain  Skin: Negative for color change  Neurological: Negative for dizziness and headaches  Psychiatric/Behavioral: Negative for behavioral problems  Objective:      /72 (BP Location: Right arm, Patient Position: Sitting, Cuff Size: Standard)   Pulse 64   Temp 98 4 °F (36 9 °C)   Resp 18          Physical Exam   Constitutional: She is cooperative  No distress  HENT:   Head: Normocephalic and atraumatic  Wearing face mask   Eyes: Right eye exhibits no discharge  Left eye exhibits no discharge  Neck: No JVD present  No thyromegaly present  Cardiovascular: Normal rate, regular rhythm and normal heart sounds  Pulmonary/Chest: Effort normal  No tachypnea  No respiratory distress  She has wheezes  She has no rales  She exhibits no tenderness  Harsh  Nonproductive cough  No SOB at rest  Expiratory wheezes throughout all lung fields that do not clear with cough   Abdominal: Soft  She exhibits no distension  There is no tenderness  Genitourinary:   Genitourinary Comments: No suprapubic tenderness   Musculoskeletal: She exhibits no edema or tenderness  Lymphadenopathy:     She has no cervical adenopathy  Neurological: She is alert  Skin: Skin is warm and dry  She is not diaphoretic  Psychiatric: She has a normal mood and affect   Her behavior is normal

## 2019-02-05 NOTE — ED NOTES
Pt refusing to walk at this time states "I'm sleepy, I like the hospital, they feed me here  I like the food " Pt able to move to the side of the bed without difficulty but refuses to stand   States "I'm going back to sleep, maybe then I'll stand "     Angelique Flor, RN  02/05/19 [id number]

## 2019-02-05 NOTE — ED PROVIDER NOTES
History  Chief Complaint   Patient presents with    Cough     pt has forceful, sometimes productive, cough for last 3 days  pt reports her roommate at [address] was just recently sick  HPI  70-year-old woman presents from nursing home with cough  She has had productive cough for the last 3 days  Patient's roommate at the nursing home also has cough  Patient denies chest pain  She denies abdominal pain nausea vomiting  Low-grade fever on presentation  Prior to Admission Medications   Prescriptions Last Dose Informant Patient Reported? Taking? Cholecalciferol (VITAMIN D) 2000 units tablet 2/4/2019 at Unknown time  No Yes   Sig: TAKE 1 TABLET BY MOUTH ONCE DAILY  ELIQUIS 2 5 MG 2/4/2019 at Unknown time  No Yes   Sig: TAKE 1 TABLET BY MOUTH TWICE DAILY  acetaminophen (TYLENOL) 500 mg tablet   Yes Yes   Sig: Take 500 mg by mouth every 6 (six) hours as needed for mild pain   atorvastatin (LIPITOR) 10 mg tablet 2/4/2019 at Unknown time  No Yes   Sig: TAKE 1 TABLET BY MOUTH ONCE DAILY  docusate sodium (COLACE) 100 mg capsule 2/4/2019 at Unknown time  No Yes   Sig: TAKE 1 CAPSULE BY MOUTH TWICE DAILY   levothyroxine 25 mcg tablet 2/4/2019 at Unknown time  No Yes   Sig: TAKE 1 TABLET BY MOUTH ONCE DAILY  lisinopril (ZESTRIL) 20 mg tablet 2/4/2019 at Unknown time  No Yes   Sig: TAKE 1 TABLET BY MOUTH ONCE DAILY *HOLD SBP<110*   meclizine (ANTIVERT) 25 mg tablet   No Yes   Sig: Take 1 tablet by mouth every 8 (eight) hours as needed for dizziness   metoprolol tartrate (LOPRESSOR) 50 mg tablet 2/4/2019 at Unknown time  No Yes   Sig: TAKE 1 TABLET BY MOUTH TWICE DAILY        Facility-Administered Medications: None       Past Medical History:   Diagnosis Date    Diabetes mellitus ([address] )     Disease of thyroid gland     Hyperlipidemia     Hypertension     Pacemaker        Past Surgical History:   Procedure Laterality Date    APPENDECTOMY  1944       Family History   Problem Relation Age of Onset    Arthritis Mother     Diabetes Mother     Hypertension Mother     Heart disease Mother     Kidney disease Father      I have reviewed and agree with the history as documented  Social History   Substance Use Topics    Smoking status: Never Smoker    Smokeless tobacco: Never Used    Alcohol use No        Review of Systems   Constitutional: Negative  Negative for chills and fever  HENT: Negative  Negative for congestion and sore throat  Eyes: Negative  Negative for discharge and redness  Respiratory: Positive for cough  Negative for chest tightness and shortness of breath  Cardiovascular: Negative  Negative for chest pain and palpitations  Gastrointestinal: Negative  Negative for abdominal pain, nausea and vomiting  Endocrine: Negative  Negative for cold intolerance and polyphagia  Genitourinary: Negative  Negative for difficulty urinating and dysuria  Musculoskeletal: Negative  Negative for arthralgias and back pain  Skin: Negative  Negative for color change and wound  Allergic/Immunologic: Negative  Negative for environmental allergies  Neurological: Negative  Negative for dizziness, weakness and headaches  Hematological: Negative  Psychiatric/Behavioral: Negative  Negative for behavioral problems  The patient is not nervous/anxious  All other systems reviewed and are negative  Physical Exam  ED Triage Vitals [02/04/19 2235]   Temperature Pulse Respirations Blood Pressure SpO2   99 8 °F (37 7 °C) 58 18 117/65 93 %      Temp Source Heart Rate Source Patient Position - Orthostatic VS BP Location FiO2 (%)   Oral Monitor Lying Right arm --      Pain Score       No Pain           Orthostatic Vital Signs  Vitals:    02/04/19 2248 02/04/19 2349 02/05/19 0000 02/05/19 0601   BP:  105/55 105/55 142/69   Pulse: 68 60 60 60   Patient Position - Orthostatic VS:  Lying Lying Lying       Physical Exam   Constitutional: She is oriented to person, place, and time   She appears well-developed and well-nourished  No distress  HENT:   Head: Normocephalic and atraumatic  Right Ear: External ear normal    Left Ear: External ear normal    Mouth/Throat: Oropharynx is clear and moist    Eyes: Pupils are equal, round, and reactive to light  Conjunctivae and EOM are normal  Right eye exhibits no discharge  Left eye exhibits no discharge  No scleral icterus  Neck: Normal range of motion  Neck supple  No tracheal deviation present  No thyromegaly present  Cardiovascular: Normal rate, regular rhythm and intact distal pulses  Exam reveals no gallop and no friction rub  No murmur heard  Pulmonary/Chest: Effort normal  No stridor  No respiratory distress  She has wheezes  She has no rales  Abdominal: Soft  Bowel sounds are normal  She exhibits no distension  There is no tenderness  There is no rebound and no guarding  Musculoskeletal: Normal range of motion  She exhibits no edema or deformity  Neurological: She is alert and oriented to person, place, and time  No cranial nerve deficit  Skin: Skin is warm and dry  No rash noted  She is not diaphoretic  No erythema  Psychiatric: She has a normal mood and affect  Her behavior is normal  Thought content normal    Nursing note and vitals reviewed        ED Medications  Medications    EMS REPLENISHMENT MED ( Does not apply Given to EMS 2/5/19 0033)   albuterol inhalation solution 5 mg (5 mg Nebulization Given 2/5/19 0033)   ipratropium (ATROVENT) 0 02 % inhalation solution 0 5 mg (0 5 mg Nebulization Given 2/5/19 0033)       Diagnostic Studies  Results Reviewed     Procedure Component Value Units Date/Time    Troponin I [[id number]]  (Normal) Collected:  02/05/19 0321    Lab Status:  Final result Specimen:  Blood from Arm, Right Updated:  02/05/19 0415     Troponin I 0 04 ng/mL     Fingerstick Glucose (POCT) [[id number]]  (Abnormal) Collected:  02/05/19 0321    Lab Status:  Final result Updated:  02/05/19 0323     POC Glucose 173 (H) mg/dl Comprehensive metabolic panel [[id number]]  (Abnormal) Collected:  02/04/19 2253    Lab Status:  Final result Specimen:  Blood from Arm, Right Updated:  02/04/19 2333     Sodium 136 mmol/L      Potassium 3 7 mmol/L      Chloride 103 mmol/L      CO2 23 mmol/L      ANION GAP 10 mmol/L      BUN 18 mg/dL      Creatinine 1 03 mg/dL      Glucose 176 (H) mg/dL      Calcium 8 4 mg/dL      AST 27 U/L      ALT 22 U/L      Alkaline Phosphatase 100 U/L      Total Protein 7 3 g/dL      Albumin 3 4 (L) g/dL      Total Bilirubin 0 48 mg/dL      eGFR 49 ml/min/1 73sq m     Narrative:         National Kidney Disease Education Program recommendations are as follows:  GFR calculation is accurate only with a steady state creatinine  Chronic Kidney disease less than 60 ml/min/1 73 sq  meters  Kidney failure less than 15 ml/min/1 73 sq  meters      B-type natriuretic peptide [[id number]]  (Abnormal) Collected:  02/04/19 2253    Lab Status:  Final result Specimen:  Blood from Arm, Right Updated:  02/04/19 2333     NT-proBNP 2,664 (H) pg/mL     Troponin I [[id number]]  (Normal) Collected:  02/04/19 2253    Lab Status:  Final result Specimen:  Blood from Arm, Right Updated:  02/04/19 2329     Troponin I 0 04 ng/mL     CBC and differential [[id number]]  (Abnormal) Collected:  02/04/19 2253    Lab Status:  Final result Specimen:  Blood from Arm, Right Updated:  02/04/19 2323     WBC 5 01 Thousand/uL      RBC 3 90 Million/uL      Hemoglobin 11 7 g/dL      Hematocrit 37 2 %      MCV 95 fL      MCH 30 0 pg      MCHC 31 5 g/dL      RDW 13 6 %      MPV 10 7 fL      Platelets 646 Thousands/uL      nRBC 0 /100 WBCs      Neutrophils Relative 43 %      Immat GRANS % 1 %      Lymphocytes Relative 37 %      Monocytes Relative 19 (H) %      Eosinophils Relative 0 %      Basophils Relative 0 %      Neutrophils Absolute 2 17 Thousands/µL      Immature Grans Absolute 0 03 Thousand/uL      Lymphocytes Absolute 1 85 Thousands/µL      Monocytes Absolute 0 95 Thousand/µL      Eosinophils Absolute 0 00 Thousand/µL      Basophils Absolute 0 01 Thousands/µL                  CT chest without contrast   Final Result by Khushbu Carr MD (02/05 0318)      No effusion, airspace disease, or pneumothorax  Workstation performed: [id number]         XR chest 2 views   ED Interpretation by Faisal Murrieta MD (02/04 2337)   No cardiopulmonary disease            Procedures  Procedures      Phone Consults  ED Phone Contact    ED Course  ED Course as of Feb 05 0755 Tue Feb 05, 2019   6227 Patient wakes up, she is able to talk in full sentences  Patient states however she is tired does not walk  Patient was witnessed to ambulate to the bathroom earlier in the evening  At this time, patient cannot be ambulated secondary to being non participatory with walking  3762 On reassessment, patient does not walk  Spoke with nursing home  She uses only a wheelchair  Will discharge the patient  Identification of Seniors at Risk      Most Recent Value   (ISAR) Identification of Seniors at Risk   Before the illness or injury that brought you to the Emergency, did you need someone to help you on a regular basis? 0 Filed at: 02/04/2019 2236   In the last 24 hours, have you needed more help than usual?  0 Filed at: 02/04/2019 2236   Have you been hospitalized for one or more nights during the past 6 months? 0 Filed at: 02/04/2019 2236   In general, do you see well?  0 Filed at: 02/04/2019 2236   In general, do you have serious problems with your memory? 0 Filed at: 02/04/2019 2236   Do you take more than three different medications every day? 1 Filed at: 02/04/2019 2236   ISAR Score  1 Filed at: 02/04/2019 2236                          MDM  Number of Diagnoses or Management Options  Cough:   Diagnosis management comments: 80-year-old woman presents with cough from nursing home  Will get a cardiac workup with a chest x-ray  Will get a BNP      Questionable infiltrate with x-ray  Will get a CT scan of her chest       Disposition  Final diagnoses:   Cough     Time reflects when diagnosis was documented in both MDM as applicable and the Disposition within this note     Time User Action Codes Description Comment    2/5/2019  6:35 AM Tashia Cruz Merida [R05] Cough       ED Disposition     ED Disposition Condition Date/Time Comment    Discharge  Tue Feb 5, 2019  6:35 AM Tomie Buerger discharge to home/self care  Condition at discharge: Stable        Follow-up Information     Follow up With Specialties Details Why Contact Info Additional [address] Emergency Department Emergency Medicine Go to If symptoms worsen [address] [place]  [phone]  ED, [address] [place] [address], [address] Emmanuel MD Geriatric Medicine, Family Medicine Schedule an appointment as soon as possible for a visit in 1 week  [address]  [phone]             Patient's Medications   Discharge Prescriptions    No medications on file     No discharge procedures on file  ED Provider  Attending physically available and evaluated Tomie Buerger I managed the patient along with the ED Attending      Electronically Signed by         Jasmin Welsh MD  02/05/19 2404

## 2019-02-05 NOTE — DISCHARGE INSTRUCTIONS
Workup was negative, no evidence of pneumonia  Please follow-up with her primary care provider, or return emergency department have any change in symptoms  Acute Cough   WHAT YOU NEED TO KNOW:   An acute cough can last up to 3 weeks  Common causes of an acute cough include a cold, allergies, or a lung infection  DISCHARGE INSTRUCTIONS:   Return to the emergency department if:   · You have trouble breathing or feel short of breath  · You cough up blood, or you see blood in your mucus  · You faint or feel weak or dizzy  · You have chest pain when you cough or take a deep breath  · You have new wheezing  Contact your healthcare provider if:   · You have a fever  · Your cough lasts longer than 4 weeks  · Your symptoms do not improve with treatment  · You have questions or concerns about your condition or care  Medicines:   · Medicines  may be needed to stop the cough, decrease swelling in your airways, or help open your airways  Medicine may also be given to help you cough up mucus  Ask your healthcare provider what over-the-counter medicines you can take  If you have an infection caused by bacteria, you may need antibiotics  · Take your medicine as directed  Contact your healthcare provider if you think your medicine is not helping or if you have side effects  Tell him or her if you are allergic to any medicine  Keep a list of the medicines, vitamins, and herbs you take  Include the amounts, and when and why you take them  Bring the list or the pill bottles to follow-up visits  Carry your medicine list with you in case of an emergency  Manage your symptoms:   · Do not smoke and stay away from others who smoke  Nicotine and other chemicals in cigarettes and cigars can cause lung damage and make your cough worse  Ask your healthcare provider for information if you currently smoke and need help to quit  E-cigarettes or smokeless tobacco still contain nicotine   Talk to your healthcare provider before you use these products  · Drink extra liquids as directed  Liquids will help thin and loosen mucus so you can cough it up  Liquids will also help prevent dehydration  Examples of good liquids to drink include water, fruit juice, and broth  Do not drink liquids that contain caffeine  Caffeine can increase your risk for dehydration  Ask your healthcare provider how much liquid to drink each day  · Rest as directed  Do not do activities that make your cough worse, such as exercise  · Use a humidifier or vaporizer  Use a cool mist humidifier or a vaporizer to increase air moisture in your home  This may make it easier for you to breathe and help decrease your cough  · Eat 2 to 5 mL of honey 2 times each day  Honey can help thin mucus and decrease your cough  · Use cough drops or lozenges  These can help decrease throat irritation and your cough  Follow up with your healthcare provider as directed:  Write down your questions so you remember to ask them during your visits  © 2017 [facility] Information is for End User's use only and may not be sold, redistributed or otherwise used for commercial purposes  All illustrations and images included in CareNotes® are the copyrighted property of [employer] , [employer]  or [employer]  The above information is an  only  It is not intended as medical advice for individual conditions or treatments  Talk to your doctor, nurse or pharmacist before following any medical regimen to see if it is safe and effective for you

## 2019-02-06 NOTE — ED ATTENDING ATTESTATION
Chikis Beckett MD, saw and evaluated the patient  I have discussed the patient with the resident/non-physician practitioner and agree with the resident's/non-physician practitioner's findings, Plan of Care, and MDM as documented in the resident's/non-physician practitioner's note, except where noted  All available labs and Radiology studies were reviewed  At this point I agree with the current assessment done in the Emergency Department  I have conducted an independent evaluation of this patient a history and physical is as follows:    42-year-old woman presenting with 3 days of productive cough and wheezing  No history of asthma or COPD  No chest pain, shortness of breath, dyspnea  On exam patient is awake and alert in no respiratory distress  Heart regular rate and rhythm, no murmurs rubs or gallops  There are expiratory wheezes in all lung fields, no rales or rhonchi, no increased work of breathing  Trace bilateral lower extremity edema without tenderness  Nonfocal neuro  Patient having improvement with breathing treatments, chest x-ray equivocal   Noncontrast chest CT to assess for pneumonia pending and will dispo depending on results and continued clinical course        Critical Care Time  Procedures

## 2019-03-12 DIAGNOSIS — I10 ESSENTIAL HYPERTENSION: ICD-10-CM

## 2019-03-12 DIAGNOSIS — E78.2 MIXED HYPERLIPIDEMIA: ICD-10-CM

## 2019-03-13 RX ORDER — LISINOPRIL 20 MG/1
TABLET ORAL
Qty: 28 TABLET | Refills: 10 | Status: SHIPPED | OUTPATIENT
Start: 2019-03-13

## 2019-03-13 RX ORDER — ATORVASTATIN CALCIUM 10 MG/1
TABLET, FILM COATED ORAL
Qty: 28 TABLET | Refills: 10 | Status: SHIPPED | OUTPATIENT
Start: 2019-03-13

## 2019-04-02 DIAGNOSIS — E11.9 TYPE 2 DIABETES MELLITUS WITHOUT COMPLICATION, UNSPECIFIED WHETHER LONG TERM INSULIN USE (HCC): Primary | ICD-10-CM

## 2019-04-03 RX ORDER — DIAPER,BRIEF,ADULT, DISPOSABLE
EACH MISCELLANEOUS
Qty: 50 EACH | Refills: 5 | Status: SHIPPED | OUTPATIENT
Start: 2019-04-03

## 2019-08-04 ENCOUNTER — HOSPITAL ENCOUNTER (EMERGENCY)
Facility: HOSPITAL | Age: 84
Discharge: HOME | End: 2019-08-04
Attending: EMERGENCY MEDICINE | Admitting: EMERGENCY MEDICINE
Payer: MEDICARE

## 2019-08-04 ENCOUNTER — APPOINTMENT (EMERGENCY)
Dept: RADIOLOGY | Facility: HOSPITAL | Age: 84
End: 2019-08-04
Payer: MEDICARE

## 2019-08-04 VITALS
SYSTOLIC BLOOD PRESSURE: 143 MMHG | WEIGHT: 125 LBS | RESPIRATION RATE: 18 BRPM | HEIGHT: 62 IN | DIASTOLIC BLOOD PRESSURE: 68 MMHG | OXYGEN SATURATION: 96 % | HEART RATE: 60 BPM | TEMPERATURE: 98.3 F | BODY MASS INDEX: 23 KG/M2

## 2019-08-04 DIAGNOSIS — M79.601 MUSCULOSKELETAL ARM PAIN, RIGHT: ICD-10-CM

## 2019-08-04 DIAGNOSIS — M19.021 OSTEOARTHRITIS OF RIGHT ELBOW: Primary | ICD-10-CM

## 2019-08-04 PROCEDURE — 73110 X-RAY EXAM OF WRIST: CPT

## 2019-08-04 PROCEDURE — 73030 X-RAY EXAM OF SHOULDER: CPT

## 2019-08-04 PROCEDURE — 99285 EMERGENCY DEPT VISIT HI MDM: CPT | Performed by: EMERGENCY MEDICINE

## 2019-08-04 PROCEDURE — 99283 EMERGENCY DEPT VISIT LOW MDM: CPT

## 2019-08-04 PROCEDURE — 73080 X-RAY EXAM OF ELBOW: CPT

## 2019-08-04 NOTE — ED NOTES
Attempted to stand patient, after a few minutes of discussion it was determined pt needs ambulance transport back to nursing home  Spoke with daughter in law who at this time informed me pt is a 2 person assist ayo lift       Nicole Rust RN  08/04/19 9195

## 2019-08-04 NOTE — DISCHARGE INSTRUCTIONS
Please use sling for right arm musculoskeletal pain  Acetaminophen and/or ibuprofen can be used for pain control as needed and tolerated  Return to the emergency department if there is severe pain, inability to move right arm/hand completely, or neurological symptoms of numbness, tingling weakness

## 2019-08-04 NOTE — ED NOTES
Attempted to get EMS transport for patient  Unsuccessful at this time  Pt assured me she walks with a walker  Pts daughter in law answered phone  Agreed to transport patient       Jayleen Stanley RN  08/04/19 2054

## 2019-08-04 NOTE — ED NOTES
[facility] ambulance to transport patient at 2130  Pt adult underwear dry at this time       Maritza Bassett RN  08/04/19 5365

## 2019-08-04 NOTE — ED PROVIDER NOTES
History  Chief Complaint   Patient presents with    Arm Pain     Pain in a vein starting in pts R hand radiating up entire RUE  79 yo F w/ h/o dementia, afib on Eliquis, pacemaker, HTN, HLD, DM2 presenting here via ambulance from [facility] for complaints of right wrist pain per nursing home report  She reportedly did not fall or have any witnessed trauma, does not recall any trauma herself  Reports that her right forearm has a "clicking" feeling when she moves her elbow joint, and that she has been unable to feed herself with her right arm because of pain with movement  Notes some swelling of her right hand especially at her wrist and her thumb  Denies numbness, tingling, or muscle weakness of her right arm/hand  Otherwise has no complaints  Denies fevers, chills, lightheadedness, dizziness, loss of consciousness, chest pain, shortness of breath, abdominal pain, changes in bowel or bladder habits  On further report from nursing home they were also not concerned other than the patient's complaints of right forearm clicking and in general difficulty using her right arm/hand  History provided by:  Patient, nursing home and EMS personnel  History limited by:  Dementia   used: No    Arm Pain   Location:  Wrist  Quality:  Cracking, dull  Severity:  Mild  Onset quality:  Unable to specify  Duration:  2 days  Timing:  Intermittent  Progression:  Unable to specify  Chronicity:  New  Relieved by:  Not moving  Worsened by: Worsened with extension  Ineffective treatments:  None tried   Associated symptoms: no abdominal pain, no chest pain, no congestion, no cough, no diarrhea, no fatigue, no fever, no headaches, no loss of consciousness, no myalgias, no nausea, no rash, no rhinorrhea, no shortness of breath, no sore throat, no vomiting and no wheezing    Risk factors:  Age      Prior to Admission Medications   Prescriptions Last Dose Informant Patient Reported? Taking? Cholecalciferol (VITAMIN D) 2000 units tablet   No No   Sig: TAKE 1 TABLET BY MOUTH ONCE DAILY  ELIQUIS 2 5 MG   No No   Sig: TAKE 1 TABLET BY MOUTH TWICE DAILY  TRUETRACK TEST test strip   No No   Sig: TEST BS THREE TIMES A WEEK   acetaminophen (TYLENOL) 500 mg tablet   Yes No   Sig: Take 500 mg by mouth every 6 (six) hours as needed for mild pain   atorvastatin (LIPITOR) 10 mg tablet   No No   Sig: TAKE 1 TABLET BY MOUTH ONCE DAILY  docusate sodium (COLACE) 100 mg capsule   No No   Sig: TAKE 1 CAPSULE BY MOUTH TWICE DAILY   levothyroxine 25 mcg tablet   No No   Sig: TAKE 1 TABLET BY MOUTH ONCE DAILY  lisinopril (ZESTRIL) 20 mg tablet   No No   Sig: TAKE 1 TABLET BY MOUTH ONCE DAILY *HOLD SBP<110*   meclizine (ANTIVERT) 25 mg tablet   No No   Sig: Take 1 tablet by mouth every 8 (eight) hours as needed for dizziness   metoprolol tartrate (LOPRESSOR) 50 mg tablet   No No   Sig: TAKE 1 TABLET BY MOUTH TWICE DAILY  Facility-Administered Medications: None       Past Medical History:   Diagnosis Date    Diabetes mellitus ([facility] [address] )     Disease of thyroid gland     Hyperlipidemia     Hypertension     Pacemaker        Past Surgical History:   Procedure Laterality Date    APPENDECTOMY  65       Family History   Problem Relation Age of Onset    Arthritis Mother     Diabetes Mother     Hypertension Mother     Heart disease Mother     Kidney disease Father      I have reviewed and agree with the history as documented  Social History     Tobacco Use    Smoking status: Never Smoker    Smokeless tobacco: Never Used   Substance Use Topics    Alcohol use: No    Drug use: No        Review of Systems   Constitutional: Positive for activity change (Difficulty using her right arm)  Negative for appetite change, chills, diaphoresis, fatigue and fever  HENT: Negative for congestion, nosebleeds, rhinorrhea, sneezing, sore throat and tinnitus      Eyes: Negative for photophobia, redness and visual disturbance  Respiratory: Negative for cough, chest tightness, shortness of breath and wheezing  Cardiovascular: Negative for chest pain, palpitations and leg swelling  Gastrointestinal: Negative for abdominal pain, constipation, diarrhea, nausea and vomiting  Genitourinary: Negative for decreased urine volume, difficulty urinating, dysuria, flank pain, frequency, hematuria, pelvic pain and urgency  Musculoskeletal: Positive for gait problem and joint swelling  Negative for arthralgias, back pain, myalgias, neck pain and neck stiffness  Skin: Negative for pallor, rash and wound  Allergic/Immunologic: Positive for immunocompromised state (DM)  Neurological: Positive for tremors (chronic, stable per patient and nursing home)  Negative for dizziness, seizures, loss of consciousness, syncope, speech difficulty, weakness, light-headedness, numbness and headaches  Hematological: Bruises/bleeds easily  Psychiatric/Behavioral: Positive for confusion  The patient is not nervous/anxious  Physical Exam  ED Triage Vitals [08/04/19 1344]   Temperature Pulse Respirations Blood Pressure SpO2   98 3 °F (36 8 °C) 66 18 154/72 96 %      Temp Source Heart Rate Source Patient Position - Orthostatic VS BP Location FiO2 (%)   Oral Monitor Lying Right arm --      Pain Score       5             Orthostatic Vital Signs  Vitals:    08/04/19 1344 08/04/19 1530 08/04/19 1630 08/04/19 1827   BP: 154/72 159/69 155/68 143/68   Pulse: 66 60 60 60   Patient Position - Orthostatic VS: Lying Lying Lying Lying       Physical Exam   Constitutional: She appears well-developed and well-nourished  No distress  HENT:   Head: Normocephalic and atraumatic  Mouth/Throat: No oropharyngeal exudate  Eyes: Pupils are equal, round, and reactive to light  Conjunctivae are normal    Neck: Normal range of motion  Cardiovascular: Normal rate, regular rhythm and normal heart sounds  No murmur heard    Pulmonary/Chest: Effort normal and breath sounds normal  No respiratory distress  She has no wheezes  She has no rales  She exhibits no tenderness  Abdominal: Soft  Bowel sounds are normal  There is no tenderness  Musculoskeletal: She exhibits edema (Slight swelling of right elbow compared to left, edema of right thumb and slight edema of wrist over ulnar insertion at joint  )  She exhibits no tenderness or deformity  Limited passive and active extension of right elbow  Full ROM about the right shoulder, wrist, PIP, and DIP joints  Neurological: She is alert  No cranial nerve deficit or sensory deficit  She exhibits normal muscle tone  Coordination normal    Is not orientated to place, time, president  Does know she's at a hospital  Does not recall that she is coming in from a nursing home  Skin: Skin is warm and dry  No rash noted  She is not diaphoretic  Psychiatric: She has a normal mood and affect  Nursing note and vitals reviewed  ED Medications  Medications - No data to display    Diagnostic Studies  Results Reviewed     None                 XR shoulder 2+ views RIGHT   Final Result by Aniyah Gómez MD (08/04 1718)      No acute osseous abnormality  Degenerative changes as described  Workstation performed: [id number]         XR elbow 3+ views RIGHT   Final Result by Aniyah Gómez MD (08/04 1718)      No acute osseous abnormality  Degenerative changes as described  Workstation performed: [id number]         XR wrist 3+ views RIGHT   Final Result by Aniyah Gómez MD (08/04 1717)      No acute osseous abnormality  Degenerative changes as described                    Workstation performed: [id number]               Procedures  Procedures        ED Course  ED Course as of Aug 05 1600   Cathie Jones Aug 04, 2019   1639 Got report from [facility], patient was mostly complaining of right wrist pain which is why they sent her in       1645 XR shoulder 2+ views RIGHT           Identification of Seniors at Risk      Most Recent Value   (ISAR) Identification of Seniors at Risk   Before the illness or injury that brought you to the Emergency, did you need someone to help you on a regular basis? 1 Filed at: 08/04/2019 1347   In the last 24 hours, have you needed more help than usual?  1 Filed at: 08/04/2019 1347   Have you been hospitalized for one or more nights during the past 6 months?  --   In general, do you see well?  0 Filed at: 08/04/2019 1347   In general, do you have serious problems with your memory? 0 Filed at: 08/04/2019 1347   Do you take more than three different medications every day? 1 Filed at: 08/04/2019 1347   ISAR Score  3 Filed at: 08/04/2019 1347                          MDM  Number of Diagnoses or Management Options  Musculoskeletal arm pain, right: new and requires workup  Osteoarthritis of right elbow: new and requires workup  Diagnosis management comments: 79 yo F with h/o dementia, afib on Eliquis, s/p pacemaker, HTN, HLD, presenting for 2 days of right elbow, forearm, and wrist "clicking", found to have plain films of complete RUE negative for acute fracture or dislocation    -Elbow film does not show acute abnormality or effusion, only chronic degenerative changes consistent with OA  Exam not concerning for cellulitis or other infection  Have given report to nursing home to watch elbow for signs of developing cellulitis  -Got report from nursing home who had no specific complaints of infection, knowledge of falls or other trauma, or change from baseline    -No other complaints from patient at this time  -Most consistent with chronic RUE OA based on plan films and exam, will be followed by PCP outpatient for this issue  -Given sling to prevent pain and allow rest of RUE for likely reactive OA  -Discharged back to nursing home facility in NAD, without significant pain, or other complaints          Amount and/or Complexity of Data Reviewed  Tests in the radiology section of CPT®: ordered and reviewed  Decide to obtain previous medical records or to obtain history from someone other than the patient: yes  Obtain history from someone other than the patient: yes  Review and summarize past medical records: yes  Discuss the patient with other providers: yes  Independent visualization of images, tracings, or specimens: yes    Risk of Complications, Morbidity, and/or Mortality  Presenting problems: minimal  Diagnostic procedures: minimal  Management options: minimal    Patient Progress  Patient progress: stable      Disposition  Final diagnoses:   Musculoskeletal arm pain, right   Osteoarthritis of right elbow     Time reflects when diagnosis was documented in both MDM as applicable and the Disposition within this note     Time User Action Codes Description Comment    8/4/2019  5:16 PM Erma Tapia Add [M79 601] Musculoskeletal arm pain, right     8/4/2019  5:32 PM Erma Tapia Add [L81 882] Osteoarthritis of right elbow     8/4/2019  5:32 PM Redd Stakes Modify [N62 377] Musculoskeletal arm pain, right     8/4/2019  5:32 PM Redd Stakes Modify [Y02 050] Osteoarthritis of right elbow       ED Disposition     ED Disposition Condition Date/Time Comment    Discharge Good Sun Aug 4, 2019  5:21 PM William Calix discharge to home/self care  Follow-up Information     Follow up With Specialties Details Why [address] [facility], CRNP Medical Surgical, Nurse Practitioner Schedule an appointment as soon as possible for a visit in 1 day To discuss your right arm musculoskeletal pain and new onset limited use of right arm   [address] [facility] [address]            Discharge Medication List as of 8/4/2019  5:22 PM      CONTINUE these medications which have NOT CHANGED    Details   acetaminophen (TYLENOL) 500 mg tablet Take 500 mg by mouth every 6 (six) hours as needed for mild pain, Historical Med      atorvastatin (LIPITOR) 10 mg tablet TAKE 1 TABLET BY MOUTH ONCE DAILY , Normal      Cholecalciferol (VITAMIN D) 2000 units tablet TAKE 1 TABLET BY MOUTH ONCE DAILY , Normal      docusate sodium (COLACE) 100 mg capsule TAKE 1 CAPSULE BY MOUTH TWICE DAILY, Normal      ELIQUIS 2 5 MG TAKE 1 TABLET BY MOUTH TWICE DAILY , Normal      levothyroxine 25 mcg tablet TAKE 1 TABLET BY MOUTH ONCE DAILY , Normal      lisinopril (ZESTRIL) 20 mg tablet TAKE 1 TABLET BY MOUTH ONCE DAILY *HOLD SBP<110*, Normal      meclizine (ANTIVERT) 25 mg tablet Take 1 tablet by mouth every 8 (eight) hours as needed for dizziness, Starting Wed 1/10/2018, Print      metoprolol tartrate (LOPRESSOR) 50 mg tablet TAKE 1 TABLET BY MOUTH TWICE DAILY , Normal      TRUETRACK TEST test strip TEST BS THREE TIMES A WEEK, Normal           No discharge procedures on file  ED Provider  Attending physically available and evaluated Beatriz Winkler I managed the patient along with the ED Attending      Electronically Signed by         Kailey Cordero MD  08/05/19 3805

## 2019-08-05 NOTE — ED NOTES
Pt transported back to [facility] via [address] [place] [address], [facility]  08/04/19 2045

## 2019-08-11 NOTE — ED ATTENDING ATTESTATION
Maya Cisneros DO, saw and evaluated the patient  I have discussed the patient with the resident/non-physician practitioner and agree with the resident's/non-physician practitioner's findings, Plan of Care, and MDM as documented in the resident's/non-physician practitioner's note, except where noted  All available labs and Radiology studies were reviewed  I was present for key portions of any procedure(s) performed by the resident/non-physician practitioner and I was immediately available to provide assistance  At this point I agree with the current assessment done in the Emergency Department  I have conducted an independent evaluation of this patient a history and physical is as follows:    80year old female with right elbow pain  Noticed elbow swelling per staff  Patient demented can not give accurate hx  Pain and cracking on ROM of right elbow  NVI otherwise   Plan for xray    Critical Care Time  Procedures

## 2019-11-19 DIAGNOSIS — E11.9 TYPE 2 DIABETES MELLITUS WITHOUT COMPLICATION, UNSPECIFIED WHETHER LONG TERM INSULIN USE (HCC): ICD-10-CM

## 2019-11-20 RX ORDER — DIAPER,BRIEF,ADULT, DISPOSABLE
EACH MISCELLANEOUS
Refills: 4 | OUTPATIENT
Start: 2019-11-20

## 2019-12-17 DIAGNOSIS — E11.9 TYPE 2 DIABETES MELLITUS WITHOUT COMPLICATION, UNSPECIFIED WHETHER LONG TERM INSULIN USE (HCC): ICD-10-CM

## 2019-12-17 RX ORDER — DIAPER,BRIEF,ADULT, DISPOSABLE
EACH MISCELLANEOUS
Refills: 0 | OUTPATIENT
Start: 2019-12-17

## 2020-11-24 ENCOUNTER — HOSPITAL ENCOUNTER (EMERGENCY)
Facility: HOSPITAL | Age: 85
Discharge: HOME | End: 2020-11-24
Attending: EMERGENCY MEDICINE | Admitting: EMERGENCY MEDICINE
Payer: MEDICARE

## 2020-11-24 ENCOUNTER — APPOINTMENT (EMERGENCY)
Dept: RADIOLOGY | Facility: HOSPITAL | Age: 85
End: 2020-11-24
Payer: MEDICARE

## 2020-11-24 VITALS
DIASTOLIC BLOOD PRESSURE: 75 MMHG | TEMPERATURE: 97.9 F | OXYGEN SATURATION: 94 % | RESPIRATION RATE: 18 BRPM | SYSTOLIC BLOOD PRESSURE: 161 MMHG | HEART RATE: 58 BPM

## 2020-11-24 DIAGNOSIS — R11.10 VOMITING: Primary | ICD-10-CM

## 2020-11-24 PROCEDURE — 71046 X-RAY EXAM CHEST 2 VIEWS: CPT

## 2020-11-24 PROCEDURE — 99284 EMERGENCY DEPT VISIT MOD MDM: CPT

## 2020-11-24 PROCEDURE — 99284 EMERGENCY DEPT VISIT MOD MDM: CPT | Performed by: EMERGENCY MEDICINE
